# Patient Record
Sex: FEMALE | Race: WHITE | NOT HISPANIC OR LATINO | Employment: FULL TIME | URBAN - METROPOLITAN AREA
[De-identification: names, ages, dates, MRNs, and addresses within clinical notes are randomized per-mention and may not be internally consistent; named-entity substitution may affect disease eponyms.]

---

## 2017-03-20 ENCOUNTER — MYC MEDICAL ADVICE (OUTPATIENT)
Dept: FAMILY MEDICINE | Facility: CLINIC | Age: 42
End: 2017-03-20

## 2017-03-23 ENCOUNTER — OFFICE VISIT (OUTPATIENT)
Dept: FAMILY MEDICINE | Facility: CLINIC | Age: 42
End: 2017-03-23
Payer: COMMERCIAL

## 2017-03-23 VITALS
TEMPERATURE: 99.3 F | WEIGHT: 146.75 LBS | SYSTOLIC BLOOD PRESSURE: 106 MMHG | HEIGHT: 69 IN | DIASTOLIC BLOOD PRESSURE: 74 MMHG | BODY MASS INDEX: 21.73 KG/M2 | OXYGEN SATURATION: 96 % | HEART RATE: 80 BPM

## 2017-03-23 DIAGNOSIS — Z12.31 VISIT FOR SCREENING MAMMOGRAM: ICD-10-CM

## 2017-03-23 DIAGNOSIS — Z00.00 ROUTINE GENERAL MEDICAL EXAMINATION AT A HEALTH CARE FACILITY: Primary | ICD-10-CM

## 2017-03-23 DIAGNOSIS — Z30.9 ENCOUNTER FOR CONTRACEPTIVE MANAGEMENT, UNSPECIFIED CONTRACEPTIVE ENCOUNTER TYPE: ICD-10-CM

## 2017-03-23 DIAGNOSIS — Z13.6 CARDIOVASCULAR SCREENING; LDL GOAL LESS THAN 160: ICD-10-CM

## 2017-03-23 DIAGNOSIS — Z13.1 SCREENING FOR DIABETES MELLITUS: ICD-10-CM

## 2017-03-23 LAB
CHOLEST SERPL-MCNC: 197 MG/DL
GLUCOSE SERPL-MCNC: 92 MG/DL (ref 70–99)
HDLC SERPL-MCNC: 71 MG/DL
LDLC SERPL CALC-MCNC: 116 MG/DL
NONHDLC SERPL-MCNC: 126 MG/DL
TRIGL SERPL-MCNC: 52 MG/DL

## 2017-03-23 PROCEDURE — 80061 LIPID PANEL: CPT | Performed by: FAMILY MEDICINE

## 2017-03-23 PROCEDURE — 82947 ASSAY GLUCOSE BLOOD QUANT: CPT | Performed by: FAMILY MEDICINE

## 2017-03-23 PROCEDURE — 99396 PREV VISIT EST AGE 40-64: CPT | Performed by: FAMILY MEDICINE

## 2017-03-23 PROCEDURE — 36415 COLL VENOUS BLD VENIPUNCTURE: CPT | Performed by: FAMILY MEDICINE

## 2017-03-23 NOTE — PATIENT INSTRUCTIONS
1. Schedule a Mammogram at your discretion.       Preventive Health Recommendations  Female Ages 40 to 49    Yearly exam:     See your health care provider every year in order to  1. Review health changes.   2. Discuss preventive care.    3. Review your medicines if your doctor prescribed any.      Get a Pap test every three years (unless you have an abnormal result and your provider advises testing more often).      If you get Pap tests with HPV test, you only need to test every 5 years, unless you have an abnormal result. You do not need a Pap test if your uterus was removed (hysterectomy) and you have not had cancer.      You should be tested each year for STDs (sexually transmitted diseases), if you're at risk.       Ask your doctor if you should have a mammogram.      Have a colonoscopy (test for colon cancer) if someone in your family has had colon cancer or polyps before age 50.       Have a cholesterol test every 5 years.       Have a diabetes test (fasting glucose) after age 45. If you are at risk for diabetes, you should have this test every 3 years.    Shots: Get a flu shot each year. Get a tetanus shot every 10 years.     Nutrition:     Eat at least 5 servings of fruits and vegetables each day.    Eat whole-grain bread, whole-wheat pasta and brown rice instead of white grains and rice.    Talk to your provider about Calcium and Vitamin D.     Lifestyle    Exercise at least 150 minutes a week (an average of 30 minutes a day, 5 days a week). This will help you control your weight and prevent disease.    Limit alcohol to one drink per day.    No smoking.     Wear sunscreen to prevent skin cancer.    See your dentist every six months for an exam and cleaning.

## 2017-03-23 NOTE — PROGRESS NOTES
SUBJECTIVE:     CC: Naina Merlos is an 42 year old woman who presents for preventive health visit.     Physical   Annual:     Getting at least 3 servings of Calcium per day::  Yes    Bi-annual eye exam::  Yes    Dental care twice a year::  Yes    Sleep apnea or symptoms of sleep apnea::  None    Diet::  Regular (no restrictions)    Frequency of exercise::  2-3 days/week    Duration of exercise::  Greater than 60 minutes    Taking medications regularly::  Not Applicable    Additional concerns today::  No    Answers for HPI/ROS submitted by the patient on 3/20/2017   Q1: Little interest or pleasure in doing things: 0=Not at all  Q2: Feeling down, depressed or hopeless: 0=Not at all  PHQ-2 Score: 0      Jordon had a vasectomy.    She was trying to incorporate flax seed into her diet to aid in lowering her cholesterol. Does not feel yogurt has helped. They are in the process of moving so her exercise has cut back to 1-2 times per week.    Stress at work has improved. There is some financial stress but overall better, not impacting her mood.       Today's PHQ-2 Score:   PHQ-2 ( 1999 Pfizer) 3/20/2017   Little interest or pleasure in doing things Not at all   Feeling down, depressed or hopeless Not at all   PHQ-2 Score 0     Abuse: Current or Past(Physical, Sexual or Emotional)- No  Do you feel safe in your environment - Yes    Social History   Substance Use Topics     Smoking status: Never Smoker     Smokeless tobacco: Never Used     Alcohol use Yes      Comment: 3-5 drinks weekly     The patient does not drink >3 drinks per day nor >7 drinks per week.    Recent Labs   Lab Test  03/15/16   0944  03/10/15   0855  03/03/14   0849   CHOL  242*  231*  217*   HDL  57  73  62   LDL  166*  138*  131*   TRIG  95  98  118   CHOLHDLRATIO   --   3.2  3.5   NHDL  185*   --    --    Reviewed orders with patient.  Reviewed health maintenance and updated orders accordingly - Yes    Mammo Decision Support:  Patient over age 50,  mutual decision to screen reflected in health maintenance.  Pertinent mammograms are reviewed under the imaging tab.  History of abnormal Pap smear:   NO - age 30- 65 PAP every 3 years recommended  Last 3 Pap Results:   PAP (no units)   Date Value   03/10/2015 NIL   02/06/2012 NIL   Reviewed and updated as needed this visit by clinical staff  Reviewed and updated as needed this visit by Provider   Past Medical History:   Diagnosis Date     Allergic rhinitis, cause unspecified      CARDIOVASCULAR SCREENING; LDL GOAL LESS THAN 160 11/18/2010      ROS:   ROS: 10 point ROS neg other than the symptoms noted above in the HPI.    This document serves as a record of the services and decisions personally performed and made by Elena Hartley MD. It was created on his/her behalf by Sharona Mora, trained medical scribe. The creation of this document is based the provider's statements to the medical scribes.    Scribe Sharona Mora, March 23, 2017  BP Readings from Last 3 Encounters:   03/23/17 106/74   03/15/16 102/87   03/10/15 122/70    Wt Readings from Last 3 Encounters:   03/23/17 66.6 kg (146 lb 12 oz)   03/15/16 68.5 kg (151 lb)   03/10/15 66.2 kg (146 lb)        Patient Active Problem List   Diagnosis     Allergic rhinitis     CARDIOVASCULAR SCREENING; LDL GOAL LESS THAN 160     Contraception     Past Surgical History:   Procedure Laterality Date     no surgeries         Social History   Substance Use Topics     Smoking status: Never Smoker     Smokeless tobacco: Never Used     Alcohol use Yes      Comment: 3-5 drinks weekly     Family History   Problem Relation Age of Onset     CANCER Mother      lymphoma         No current outpatient prescriptions on file.     Allergies   Allergen Reactions     Penicillins      Recent Labs   Lab Test  03/15/16   0944  03/10/15   0855  03/03/14   0849   11/16/10   0832   LDL  166*  138*  131*   < >  126   HDL  57  73  62   < >  61   TRIG  95  98  118   < >  114   TSH   --    --     "--    --   1.26    < > = values in this interval not displayed.      OBJECTIVE:     /74 (Cuff Size: Adult Regular)  Pulse 80  Temp 99.3  F (37.4  C) (Oral)  Ht 1.74 m (5' 8.5\")  Wt 66.6 kg (146 lb 12 oz)  LMP 02/25/2017  SpO2 96%  BMI 21.99 kg/m2  EXAM:  GENERAL: healthy, alert and no distress  EYES: Eyes grossly normal to inspection, PERRL and conjunctivae and sclerae normal  HENT: ear canals and TM's normal, nose and mouth without ulcers or lesions  NECK: no adenopathy, no asymmetry, masses, or scars and thyroid normal to palpation  RESP: lungs clear to auscultation - no rales, rhonchi or wheezes  BREAST: normal without masses, tenderness or nipple discharge and no palpable axillary masses or adenopathy  CV: regular rate and rhythm, normal S1 S2, no S3 or S4, no murmur, click or rub, no peripheral edema and peripheral pulses strong  ABDOMEN: soft, nontender, no hepatosplenomegaly, no masses and bowel sounds normal  MS: no gross musculoskeletal defects noted, no edema  SKIN: no suspicious lesions or rashes  NEURO: Normal strength and tone, mentation intact and speech normal  PSYCH: mentation appears normal, affect normal/bright    ASSESSMENT/PLAN:     1. Routine general medical examination at a health care facility  -- PAP due next year  Mammogram this year   2. CARDIOVASCULAR SCREENING; LDL GOAL LESS THAN 160  Cholesterol was elevated at last preventative visit. Rechecking today.  - LIPID REFLEX TO DIRECT LDL PANEL    3. Encounter for contraceptive management, unspecified contraceptive encounter type   had a vasectomy.     4. Visit for screening mammogram  Dense breast tissue with yearly mammograms recommended. She will schedule at her discretion this year or continue to follow up with mammograms every other year.     5. Screening for diabetes mellitus  No fhx of diabetes, checking glucose with fasting labs.      COUNSELING:  Reviewed preventive health counseling, as reflected in patient " "instructions   reports that she has never smoked. She has never used smokeless tobacco.  Estimated body mass index is 21.99 kg/(m^2) as calculated from the following:    Height as of this encounter: 1.74 m (5' 8.5\").    Weight as of this encounter: 66.6 kg (146 lb 12 oz).   Weight management plan noted, stable and monitoring    Counseling Resources:  ATP IV Guidelines  Pooled Cohorts Equation Calculator  Breast Cancer Risk Calculator  FRAX Risk Assessment  ICSI Preventive Guidelines  Dietary Guidelines for Americans, 2010  USDA's MyPlate  ASA Prophylaxis  Lung CA Screening    The information in this document, created by the medical scribe for me, accurately reflects the services I personally performed and the decisions made by me. I have reviewed and approved this document for accuracy prior to leaving the patient care area. 03/23/17    Elena Hartley MD  [facility]      "

## 2017-03-23 NOTE — NURSING NOTE
"Chief Complaint   Patient presents with     Physical     pt is fasting        Initial /74 (Cuff Size: Adult Regular)  Pulse 80  Temp 99.3  F (37.4  C) (Oral)  Ht 5' 8.5\" (1.74 m)  Wt 146 lb 12 oz (66.6 kg)  LMP 02/25/2017  SpO2 96%  BMI 21.99 kg/m2 Estimated body mass index is 21.99 kg/(m^2) as calculated from the following:    Height as of this encounter: 5' 8.5\" (1.74 m).    Weight as of this encounter: 146 lb 12 oz (66.6 kg).  Medication Reconciliation: complete     Veronica Wilson, PAUL      "

## 2017-03-23 NOTE — MR AVS SNAPSHOT
After Visit Summary   3/23/2017    Naina Merlos    MRN: [id number]           Patient Information     Date Of Birth          1975        Visit Information        Provider Department      3/23/2017 8:00 AM Elena Hartley MD [facility]        Today's Diagnoses     Routine general medical examination at a health care facility    -  1    CARDIOVASCULAR SCREENING; LDL GOAL LESS THAN 160        Encounter for contraceptive management, unspecified contraceptive encounter type        Visit for screening mammogram        Screening for diabetes mellitus          Care Instructions    1. Schedule a Mammogram at your discretion.       Preventive Health Recommendations  Female Ages 40 to 49    Yearly exam:     See your health care provider every year in order to  1. Review health changes.   2. Discuss preventive care.    3. Review your medicines if your doctor prescribed any.      Get a Pap test every three years (unless you have an abnormal result and your provider advises testing more often).      If you get Pap tests with HPV test, you only need to test every 5 years, unless you have an abnormal result. You do not need a Pap test if your uterus was removed (hysterectomy) and you have not had cancer.      You should be tested each year for STDs (sexually transmitted diseases), if you're at risk.       Ask your doctor if you should have a mammogram.      Have a colonoscopy (test for colon cancer) if someone in your family has had colon cancer or polyps before age 50.       Have a cholesterol test every 5 years.       Have a diabetes test (fasting glucose) after age 45. If you are at risk for diabetes, you should have this test every 3 years.    Shots: Get a flu shot each year. Get a tetanus shot every 10 years.     Nutrition:     Eat at least 5 servings of fruits and vegetables each day.    Eat whole-grain bread, whole-wheat pasta and brown rice instead of white grains and rice.    Talk to  your provider about Calcium and Vitamin D.     Lifestyle    Exercise at least 150 minutes a week (an average of 30 minutes a day, 5 days a week). This will help you control your weight and prevent disease.    Limit alcohol to one drink per day.    No smoking.     Wear sunscreen to prevent skin cancer.    See your dentist every six months for an exam and cleaning.        Follow-ups after your visit        Future tests that were ordered for you today     Open Future Orders        Priority Expected Expires Ordered    MA Screen Bilateral w/Lexa Routine  3/23/2018 3/23/2017            Who to contact     If you have questions or need follow up information about today's clinic visit or your schedule please contact [facility] directly at [phone].  Normal or non-critical lab and imaging results will be communicated to you by Excellence4uhart, letter or phone within 4 business days after the clinic has received the results. If you do not hear from us within 7 days, please contact the clinic through Brightleaft or phone. If you have a critical or abnormal lab result, we will notify you by phone as soon as possible.  Submit refill requests through myParcelDelivery or call your pharmacy and they will forward the refill request to us. Please allow 3 business days for your refill to be completed.          Additional Information About Your Visit        myParcelDelivery Information     myParcelDelivery gives you secure access to your electronic health record. If you see a primary care provider, you can also send messages to your care team and make appointments. If you have questions, please call your primary care clinic.  If you do not have a primary care provider, please call [phone] and they will assist you.        Care EveryWhere ID     This is your Care EveryWhere ID. This could be used by other organizations to access your [place] medical records  [id number]        Your Vitals Were     Pulse Temperature Height Last Period Pulse Oximetry  "BMI (Body Mass Index)    80 99.3  F (37.4  C) (Oral) 5' 8.5\" (1.74 m) 02/25/2017 96% 21.99 kg/m2       Blood Pressure from Last 3 Encounters:   03/23/17 106/74   03/15/16 102/87   03/10/15 122/70    Weight from Last 3 Encounters:   03/23/17 146 lb 12 oz (66.6 kg)   03/15/16 151 lb (68.5 kg)   03/10/15 146 lb (66.2 kg)              We Performed the Following     Glucose     LIPID REFLEX TO DIRECT LDL PANEL        Primary Care Provider Office Phone # Fax #    Elena Hartley [phone]       [address] [address]        Thank you!     Thank you for choosing [facility]  for your care. Our goal is always to provide you with excellent care. Hearing back from our patients is one way we can continue to improve our services. Please take a few minutes to complete the written survey that you may receive in the mail after your visit with us. Thank you!             Your Updated Medication List - Protect others around you: Learn how to safely use, store and throw away your medicines at www.disposemymeds.org.      Notice  As of 3/23/2017  8:18 AM    You have not been prescribed any medications.      "

## 2017-04-05 NOTE — PROGRESS NOTES
Excellent! Please call or sent a GlassBox message if you have any questions. Elena Hartley M.D.       Desired or goal lipid levels are:  CHOLESTEROL: Desirable is less than 200.  HDL (Good Cholesterol): Desirable is greater than 40 in men and greater than 50 in women.  LDL (Bad Cholesterol): Desirable is less than 130 or less than 100 in patients with diabetes or vascular disease. For some patients with diabetes or vascular disease, the desireable LDL is less than 70.  TRIGLYCERIDES: Desirable is less than 150.

## 2018-03-24 ENCOUNTER — MYC MEDICAL ADVICE (OUTPATIENT)
Dept: FAMILY MEDICINE | Facility: CLINIC | Age: 43
End: 2018-03-24

## 2018-03-24 DIAGNOSIS — Z13.6 CARDIOVASCULAR SCREENING; LDL GOAL LESS THAN 160: Primary | ICD-10-CM

## 2018-03-24 DIAGNOSIS — Z13.1 SCREENING FOR DIABETES MELLITUS: ICD-10-CM

## 2018-03-26 NOTE — TELEPHONE ENCOUNTER
Lipid panel pended, along with glucose.    Dr. Hartley-Please review and sign if agree.    Thank you!  ELEN Giles, YULIYAN, RN

## 2018-03-27 ENCOUNTER — OFFICE VISIT (OUTPATIENT)
Dept: FAMILY MEDICINE | Facility: CLINIC | Age: 43
End: 2018-03-27
Payer: COMMERCIAL

## 2018-03-27 VITALS
TEMPERATURE: 97.7 F | SYSTOLIC BLOOD PRESSURE: 109 MMHG | OXYGEN SATURATION: 98 % | HEIGHT: 69 IN | DIASTOLIC BLOOD PRESSURE: 62 MMHG | WEIGHT: 149 LBS | RESPIRATION RATE: 10 BRPM | HEART RATE: 58 BPM | BODY MASS INDEX: 22.07 KG/M2

## 2018-03-27 DIAGNOSIS — Z00.00 ENCOUNTER FOR ROUTINE ADULT HEALTH EXAMINATION WITHOUT ABNORMAL FINDINGS: Primary | ICD-10-CM

## 2018-03-27 DIAGNOSIS — Z13.1 SCREENING FOR DIABETES MELLITUS: ICD-10-CM

## 2018-03-27 DIAGNOSIS — Z13.6 CARDIOVASCULAR SCREENING; LDL GOAL LESS THAN 160: ICD-10-CM

## 2018-03-27 DIAGNOSIS — Z12.31 VISIT FOR SCREENING MAMMOGRAM: ICD-10-CM

## 2018-03-27 LAB
CHOLEST SERPL-MCNC: 207 MG/DL
GLUCOSE SERPL-MCNC: 83 MG/DL (ref 70–99)
HDLC SERPL-MCNC: 56 MG/DL
LDLC SERPL CALC-MCNC: 131 MG/DL
NONHDLC SERPL-MCNC: 151 MG/DL
TRIGL SERPL-MCNC: 100 MG/DL

## 2018-03-27 PROCEDURE — 82947 ASSAY GLUCOSE BLOOD QUANT: CPT | Performed by: FAMILY MEDICINE

## 2018-03-27 PROCEDURE — G0145 SCR C/V CYTO,THINLAYER,RESCR: HCPCS | Performed by: FAMILY MEDICINE

## 2018-03-27 PROCEDURE — 87624 HPV HI-RISK TYP POOLED RSLT: CPT | Performed by: FAMILY MEDICINE

## 2018-03-27 PROCEDURE — 36415 COLL VENOUS BLD VENIPUNCTURE: CPT | Performed by: FAMILY MEDICINE

## 2018-03-27 PROCEDURE — 80061 LIPID PANEL: CPT | Performed by: FAMILY MEDICINE

## 2018-03-27 PROCEDURE — 99396 PREV VISIT EST AGE 40-64: CPT | Performed by: FAMILY MEDICINE

## 2018-03-27 NOTE — MR AVS SNAPSHOT
After Visit Summary   3/27/2018    Naina Merlos    MRN: [id number]           Patient Information     Date Of Birth          1975        Visit Information        Provider Department      3/27/2018 7:40 AM Elena Hartley MD [facility]        Today's Diagnoses     Encounter for routine adult health examination without abnormal findings    -  1    Visit for screening mammogram          Care Instructions      Preventive Health Recommendations  Female Ages 40 to 49    Yearly exam:     See your health care provider every year in order to  1. Review health changes.   2. Discuss preventive care.    3. Review your medicines if your doctor prescribed any.      Get a Pap test every three years (unless you have an abnormal result and your provider advises testing more often).      If you get Pap tests with HPV test, you only need to test every 5 years, unless you have an abnormal result. You do not need a Pap test if your uterus was removed (hysterectomy) and you have not had cancer.      You should be tested each year for STDs (sexually transmitted diseases), if you're at risk.       Ask your doctor if you should have a mammogram.      Have a colonoscopy (test for colon cancer) if someone in your family has had colon cancer or polyps before age 50.       Have a cholesterol test every 5 years.       Have a diabetes test (fasting glucose) after age 45. If you are at risk for diabetes, you should have this test every 3 years.    Shots: Get a flu shot each year. Get a tetanus shot every 10 years.     Nutrition:     Eat at least 5 servings of fruits and vegetables each day.    Eat whole-grain bread, whole-wheat pasta and brown rice instead of white grains and rice.    Talk to your provider about Calcium and Vitamin D.     Lifestyle    Exercise at least 150 minutes a week (an average of 30 minutes a day, 5 days a week). This will help you control your weight and prevent disease.    Limit  "alcohol to one drink per day.    No smoking.     Wear sunscreen to prevent skin cancer.    See your dentist every six months for an exam and cleaning.          Follow-ups after your visit        Future tests that were ordered for you today     Open Future Orders        Priority Expected Expires Ordered    MA SCREENING DIGITAL BILAT - Future  (s+30) Routine  3/27/2019 3/27/2018            Who to contact     If you have questions or need follow up information about today's clinic visit or your schedule please contact [facility] directly at [phone].  Normal or non-critical lab and imaging results will be communicated to you by Vatorhart, letter or phone within 4 business days after the clinic has received the results. If you do not hear from us within 7 days, please contact the clinic through [employer] or phone. If you have a critical or abnormal lab result, we will notify you by phone as soon as possible.  Submit refill requests through Thatgamecompany or call your pharmacy and they will forward the refill request to us. Please allow 3 business days for your refill to be completed.          Additional Information About Your Visit        Vatorhart Information     Thatgamecompany gives you secure access to your electronic health record. If you see a primary care provider, you can also send messages to your care team and make appointments. If you have questions, please call your primary care clinic.  If you do not have a primary care provider, please call [phone] and they will assist you.        Care EveryWhere ID     This is your Care EveryWhere ID. This could be used by other organizations to access your [place] medical records  [id number]        Your Vitals Were     Pulse Temperature Respirations Height Last Period Pulse Oximetry    58 97.7  F (36.5  C) (Tympanic) 10 5' 8.5\" (1.74 m) 03/21/2018 98%    BMI (Body Mass Index)                   22.33 kg/m2            Blood Pressure from Last 3 Encounters:   03/27/18 " 109/62   03/23/17 106/74   03/15/16 102/87    Weight from Last 3 Encounters:   03/27/18 149 lb (67.6 kg)   03/23/17 146 lb 12 oz (66.6 kg)   03/15/16 151 lb (68.5 kg)              We Performed the Following     HPV High Risk Types DNA Cervical     Pap imaged thin layer screen with HPV - recommended age 30 - 65 years (select HPV order below)        Primary Care Provider Office Phone # Fax #    Elena Hartley [phone] [address]  [facility] [address]        Equal Access to Services     CLAUDIO [place] : Hadii keli fernández hadgrazyna Poole, wajose torres, pk kaalmawilliam reyes, marco cai . So [facility] [phone].    ATENCIÓN: Si habla español, tiene a godoy disposición servicios gratuitos de asistencia lingüística. [facility] [phone].    We comply with applicable federal civil rights laws and [place] laws. We do not discriminate on the basis of race, color, national origin, age, disability, sex, sexual orientation, or gender identity.            Thank you!     Thank you for choosing [facility]  for your care. Our goal is always to provide you with excellent care. Hearing back from our patients is one way we can continue to improve our services. Please take a few minutes to complete the written survey that you may receive in the mail after your visit with us. Thank you!             Your Updated Medication List - Protect others around you: Learn how to safely use, store and throw away your medicines at www.disposemymeds.org.      Notice  As of 3/27/2018  7:51 AM    You have not been prescribed any medications.

## 2018-03-27 NOTE — PROGRESS NOTES
SUBJECTIVE:   CC: Naina Merlos is an 43 year old woman who presents for preventive health visit.     Physical   Annual:     Getting at least 3 servings of Calcium per day::  Yes    Bi-annual eye exam::  Yes    Dental care twice a year::  Yes    Sleep apnea or symptoms of sleep apnea::  None    Diet::  Regular (no restrictions)    Frequency of exercise::  2-3 days/week    Duration of exercise::  45-60 minutes    Taking medications regularly::  Not Applicable    Additional concerns today::  No          She recently got  at the end of last year and moved to [place]. She still works in the city, she has some mild annoyances with her coworkers, but nothing she can't manage.          Today's PHQ-2 Score:   PHQ-2 ( 1999 [employer]) 3/24/2018   Q1: Little interest or pleasure in doing things 0   Q2: Feeling down, depressed or hopeless 0   PHQ-2 Score 0   Q1: Little interest or pleasure in doing things Not at all   Q2: Feeling down, depressed or hopeless Not at all   PHQ-2 Score 0     Answers for HPI/ROS submitted by the patient on 3/24/2018   PHQ-2 Score: 0    Abuse: Current or Past(Physical, Sexual or Emotional)- No  Do you feel safe in your environment - Yes    Social History   Substance Use Topics     Smoking status: Never Smoker     Smokeless tobacco: Never Used     Alcohol use Yes      Comment: 3-5 drinks weekly     Alcohol Use 3/24/2018   If you drink alcohol do you typically have greater than 3 drinks per day OR greater than 7 drinks per week? No   No flowsheet data found.    Reviewed orders with patient.  Reviewed health maintenance and updated orders accordingly - Yes  Labs reviewed in EPIC    Patient under age 50, mutual decision reflected in health maintenance.      Pertinent mammograms are reviewed under the imaging tab.  History of abnormal Pap smear: NO - age 30-65 PAP every 5 years with negative HPV co-testing recommended    Reviewed and updated as needed this visit by clinical staff  Tobacco   "Allergies  Meds  Med Hx  Surg Hx  Fam Hx  Soc Hx      Reviewed and updated as needed this visit by Provider        Past Medical History:   Diagnosis Date     Allergic rhinitis, cause unspecified      CARDIOVASCULAR SCREENING; LDL GOAL LESS THAN 160 11/18/2010      Past Surgical History:   Procedure Laterality Date     no surgeries         Review of Systems  10 point ROS is negative except as noted above.     This document serves as a record of the services and decisions personally performed and made by Elena Hartley MD. It was created on her behalf by Kaylah Sanchez, a trained medical scribe. The creation of this document is based the provider's statements to the medical scribe.    Kaylah Sanchez, March 27, 2018 7:53 AM    OBJECTIVE:   /62  Pulse 58  Temp 97.7  F (36.5  C) (Tympanic)  Resp 10  Ht 1.74 m (5' 8.5\")  Wt 67.6 kg (149 lb)  LMP 03/21/2018  SpO2 98%  BMI 22.33 kg/m2  Physical Exam  GENERAL: healthy, alert and no distress  EYES: Eyes grossly normal to inspection, PERRL and conjunctivae and sclerae normal  HENT: ear canals and TM's normal, nose and mouth without ulcers or lesions  NECK: no adenopathy, no asymmetry, masses, or scars and thyroid normal to palpation  RESP: lungs clear to auscultation - no rales, rhonchi or wheezes  BREAST: normal without masses, tenderness or nipple discharge and no palpable axillary masses or adenopathy  CV: regular rate and rhythm, normal S1 S2, no S3 or S4, no murmur, click or rub, no peripheral edema and peripheral pulses strong  ABDOMEN: soft, nontender, no hepatosplenomegaly, no masses and bowel sounds normal   (female): normal female external genitalia, normal urethral meatus, vaginal mucosa pink, moist, well rugated, and normal cervix   MS: no gross musculoskeletal defects noted, no edema  SKIN: no suspicious lesions or rashes  NEURO: Normal strength and tone, mentation intact and speech normal  PSYCH: mentation appears normal, affect " "normal/bright    ASSESSMENT/PLAN:   1. Encounter for routine adult health examination without abnormal findings  Patient due for her PAP smear today. Can repeat in 5 years per guidelines if normal.   She will schedule her mammogram  - Pap imaged thin layer screen with HPV - recommended age 30 - 65 years (select HPV order below)  - HPV High Risk Types DNA Cervical    2. Visit for screening mammogram     - MA SCREENING DIGITAL BILAT - Future  (s+30); Future    3. CARDIOVASCULAR SCREENING; LDL GOAL LESS THAN 160  Pt requests recheck lipids given history of elevated lipids. Last year, lipids looked great.   - Lipid panel reflex to direct LDL Fasting    4. Screening for diabetes mellitus  Last glucose lab was 92 on 3/23/17. Pt requests glucose today.   - Glucose    COUNSELING:  Reviewed preventive health counseling, as reflected in patient instructions       Healthy diet/nutrition        PAP Smear       Screening Mammogram     reports that she has never smoked. She has never used smokeless tobacco.  Estimated body mass index is 22.33 kg/(m^2) as calculated from the following:    Height as of this encounter: 1.74 m (5' 8.5\").    Weight as of this encounter: 67.6 kg (149 lb).     Counseling Resources:  ATP IV Guidelines  Pooled Cohorts Equation Calculator  Breast Cancer Risk Calculator  FRAX Risk Assessment  ICSI Preventive Guidelines  Dietary Guidelines for Americans, 2010  USDA's MyPlate  ASA Prophylaxis  Lung CA Screening    The information in this document, created by the medical scribe for me, accurately reflects the services I personally performed and the decisions made by me. I have reviewed and approved this document for accuracy.     Elena Hartley MD, MD  [facility]  "

## 2018-03-28 LAB
COPATH REPORT: NORMAL
PAP: NORMAL

## 2018-03-29 NOTE — PROGRESS NOTES
The results of your recent lipid (cholesterol) profile were mildly abnormal.    Here are the results:  Lab Results       Component                Value               Date                       CHOL                     207                 03/27/2018            Lab Results       Component                Value               Date                       HDL                      56                  03/27/2018            Lab Results       Component                Value               Date                       LDL                      131                 03/27/2018            Lab Results       Component                Value               Date                       TRIG                     100                 03/27/2018            Lab Results       Component                Value               Date                       CHOLHDLRATIO             3.2                 03/10/2015              Desired or goal levels are:  CHOLESTEROL: Desirable is less than 200.   HDL (Good Cholesterol): Desirable is greater than 40 (for men) greater than 50 (for women).  LDL (Bad Cholesterol): Desirable is less than 130 (or less than 100 if you have heart disease or diabetes). Borderline 130-160.  TRIGLYCERIDES: Desirable is less than 150.  Borderline is 150-200.    To help lower your LDL (bad cholesterol) you could start adding ground flax seed to your diet. The recommended dose is 2 heaping tablespoonfuls daily, you may want to start with 1 tablespoonful and increase to 2 heaping tablespoonfuls. You can mix or add ground flax seed to hot cereals, yogurt, applesauce, cottage cheese or any sauce mixture that is your portion. Ground flax seed can be found in the baking aisle near the flour.      As you may know, an elevated cholesterol is one factor that increases your risk for heart disease and stroke. You can improve your cholesterol by controlling the amount and type of fat you eat and by increasing your daily activity level.    Here are some  ways to improve your nutrition:  Eat less fat (especially butter, Crisco and other saturated fats)  Buy lean cuts of meat, reduce your portions of red meat or substitute poultry or fish  Use skim milk and low-fat dairy products  Eat no more than 4 egg yolks per week  Avoid fried or fast foods that are high in fat  Eat more fruits and vegetables      Also consider starting or increasing your aerobic activity. Aerobic activity is the best way to improve HDL (good) cholesterol. If this would be new to you, please talk with me first about what activities are safe for you.      Other lab results:    Your glucose (blood sugar) was normal.     Please feel free to contact us with any questions or if you would like more information.    Elena Hatrley M.D.

## 2018-03-30 LAB
FINAL DIAGNOSIS: NORMAL
HPV HR 12 DNA CVX QL NAA+PROBE: NEGATIVE
HPV16 DNA SPEC QL NAA+PROBE: NEGATIVE
HPV18 DNA SPEC QL NAA+PROBE: NEGATIVE
SPECIMEN DESCRIPTION: NORMAL
SPECIMEN SOURCE CVX/VAG CYTO: NORMAL

## 2018-04-11 ENCOUNTER — HOSPITAL ENCOUNTER (OUTPATIENT)
Dept: MAMMOGRAPHY | Facility: CLINIC | Age: 43
Discharge: HOME | End: 2018-04-11
Attending: FAMILY MEDICINE | Admitting: FAMILY MEDICINE
Payer: COMMERCIAL

## 2018-04-11 DIAGNOSIS — Z12.31 VISIT FOR SCREENING MAMMOGRAM: ICD-10-CM

## 2018-04-11 PROCEDURE — 77067 SCR MAMMO BI INCL CAD: CPT

## 2019-04-01 ASSESSMENT — ENCOUNTER SYMPTOMS
PARESTHESIAS: 0
CONSTIPATION: 0
COUGH: 1
FREQUENCY: 0
ABDOMINAL PAIN: 0
ARTHRALGIAS: 0
PALPITATIONS: 0
DIZZINESS: 0
MYALGIAS: 0
EYE PAIN: 0
HEADACHES: 0
HEARTBURN: 0
SORE THROAT: 0
NERVOUS/ANXIOUS: 0
FEVER: 0
WEAKNESS: 0
NAUSEA: 0
CHILLS: 0
BREAST MASS: 0
HEMATURIA: 0
DYSURIA: 0
JOINT SWELLING: 0
DIARRHEA: 0
SHORTNESS OF BREATH: 0
HEMATOCHEZIA: 0

## 2019-04-04 ENCOUNTER — OFFICE VISIT (OUTPATIENT)
Dept: FAMILY MEDICINE | Facility: CLINIC | Age: 44
End: 2019-04-04
Payer: COMMERCIAL

## 2019-04-04 VITALS
WEIGHT: 152 LBS | HEIGHT: 69 IN | TEMPERATURE: 98.7 F | BODY MASS INDEX: 22.51 KG/M2 | DIASTOLIC BLOOD PRESSURE: 53 MMHG | HEART RATE: 61 BPM | OXYGEN SATURATION: 100 % | SYSTOLIC BLOOD PRESSURE: 95 MMHG

## 2019-04-04 DIAGNOSIS — Z00.00 ENCOUNTER FOR ROUTINE ADULT HEALTH EXAMINATION WITHOUT ABNORMAL FINDINGS: Primary | ICD-10-CM

## 2019-04-04 DIAGNOSIS — Z12.31 VISIT FOR SCREENING MAMMOGRAM: ICD-10-CM

## 2019-04-04 DIAGNOSIS — Z30.9 ENCOUNTER FOR CONTRACEPTIVE MANAGEMENT, UNSPECIFIED TYPE: ICD-10-CM

## 2019-04-04 DIAGNOSIS — Z13.1 SCREENING FOR DIABETES MELLITUS: ICD-10-CM

## 2019-04-04 DIAGNOSIS — Z13.6 CARDIOVASCULAR SCREENING; LDL GOAL LESS THAN 160: ICD-10-CM

## 2019-04-04 DIAGNOSIS — Z00.00 ROUTINE GENERAL MEDICAL EXAMINATION AT A HEALTH CARE FACILITY: ICD-10-CM

## 2019-04-04 LAB
CHOLEST SERPL-MCNC: 201 MG/DL
GLUCOSE SERPL-MCNC: 86 MG/DL (ref 70–99)
HDLC SERPL-MCNC: 48 MG/DL
LDLC SERPL CALC-MCNC: 126 MG/DL
NONHDLC SERPL-MCNC: 153 MG/DL
TRIGL SERPL-MCNC: 134 MG/DL

## 2019-04-04 PROCEDURE — 80061 LIPID PANEL: CPT | Performed by: FAMILY MEDICINE

## 2019-04-04 PROCEDURE — 99396 PREV VISIT EST AGE 40-64: CPT | Performed by: FAMILY MEDICINE

## 2019-04-04 PROCEDURE — 82947 ASSAY GLUCOSE BLOOD QUANT: CPT | Performed by: FAMILY MEDICINE

## 2019-04-04 PROCEDURE — 36415 COLL VENOUS BLD VENIPUNCTURE: CPT | Performed by: FAMILY MEDICINE

## 2019-04-04 ASSESSMENT — MIFFLIN-ST. JEOR: SCORE: 1385.91

## 2019-04-04 ASSESSMENT — ENCOUNTER SYMPTOMS
NAUSEA: 0
HEMATURIA: 0
DYSURIA: 0
SORE THROAT: 0
BREAST MASS: 0
NERVOUS/ANXIOUS: 0
DIARRHEA: 0
ABDOMINAL PAIN: 0
DIZZINESS: 0
ARTHRALGIAS: 0
COUGH: 1
PARESTHESIAS: 0
JOINT SWELLING: 0
CHILLS: 0
HEMATOCHEZIA: 0
CONSTIPATION: 0
SHORTNESS OF BREATH: 0
PALPITATIONS: 0
FREQUENCY: 0
MYALGIAS: 0
EYE PAIN: 0
WEAKNESS: 0
HEARTBURN: 0
FEVER: 0
HEADACHES: 0

## 2019-04-04 NOTE — PROGRESS NOTES
SUBJECTIVE:   CC: Naina Merlos is an 44 year old woman who presents for preventive health visit.     Physical   Annual:     Getting at least 3 servings of Calcium per day:  Yes    Bi-annual eye exam:  Yes    Dental care twice a year:  Yes    Sleep apnea or symptoms of sleep apnea:  None    Diet:  Regular (no restrictions)    Frequency of exercise:  4-5 days/week    Duration of exercise:  45-60 minutes    Taking medications regularly:  Not Applicable    Additional concerns today:  No    PHQ-2 Total Score: 0    She has a cold. This is improving and she has no concerns about it.     Today's PHQ-2 Score:   PHQ-2 ( 1999 [employer]) 4/1/2019   Q1: Little interest or pleasure in doing things 0   Q2: Feeling down, depressed or hopeless 0   PHQ-2 Score 0   Q1: Little interest or pleasure in doing things Not at all   Q2: Feeling down, depressed or hopeless Not at all   PHQ-2 Score 0       Abuse: Current or Past(Physical, Sexual or Emotional)- No  Do you feel safe in your environment? Yes    Social History     Tobacco Use     Smoking status: Never Smoker     Smokeless tobacco: Never Used   Substance Use Topics     Alcohol use: Yes     Comment: 3-5 drinks weekly     Alcohol Use 4/1/2019   If you drink alcohol do you typically have greater than 3 drinks per day OR greater than 7 drinks per week? No   No flowsheet data found.    Reviewed orders with patient.  Reviewed health maintenance and updated orders accordingly - Yes  BP Readings from Last 3 Encounters:   04/04/19 95/53   03/27/18 109/62   03/23/17 106/74    Wt Readings from Last 3 Encounters:   04/04/19 68.9 kg (152 lb)   03/27/18 67.6 kg (149 lb)   03/23/17 66.6 kg (146 lb 12 oz)                  Patient Active Problem List   Diagnosis     Allergic rhinitis     CARDIOVASCULAR SCREENING; LDL GOAL LESS THAN 160     Contraception     Past Surgical History:   Procedure Laterality Date     no surgeries         Social History     Tobacco Use     Smoking status: Never  Smoker     Smokeless tobacco: Never Used   Substance Use Topics     Alcohol use: Yes     Comment: 3-5 drinks weekly     Family History   Problem Relation Age of Onset     Cancer Mother         lymphoma     Hyperlipidemia Mother      Other Cancer Mother         Non-Hodgkins Lymphoma -     Thyroid Disease Mother              Hyperlipidemia Father      Diabetes Cousin      Coronary Artery Disease Maternal Grandmother         Triple bypass -      Cerebrovascular Disease Maternal Grandmother              Other Cancer Maternal Grandmother         Had kidney removed - ; vertibrae -      Cerebrovascular Disease Maternal Grandfather         Pacemaker     Breast Cancer Cousin      Breast Cancer Paternal Grandmother           -  years old         No current outpatient medications on file.     Allergies   Allergen Reactions     Penicillins      Recent Labs   Lab Test 18  0812 17  0820 03/15/16  0944   * 116* 166*   HDL 56 71 57   TRIG 100 52 95        Mammogram Screening: Patient under age 50, mutual decision reflected in health maintenance.      Pertinent mammograms are reviewed under the imaging tab.  History of abnormal Pap smear:   NO - age 30-65 PAP every 5 years with negative HPV co-testing recommended  Last 3 Pap and HPV Results:   PAP / HPV Latest Ref Rng & Units 3/27/2018 3/10/2015 2012   PAP - OTHER-NIL, See Result NIL NIL   HPV 16 DNA NEG:Negative Negative - -   HPV 18 DNA NEG:Negative Negative - -   OTHER HR HPV NEG:Negative Negative - -     PAP / HPV Latest Ref Rng & Units 3/27/2018 3/10/2015 2012   PAP - OTHER-NIL, See Result NIL NIL   HPV 16 DNA NEG:Negative Negative - -   HPV 18 DNA NEG:Negative Negative - -   OTHER HR HPV NEG:Negative Negative - -     Reviewed and updated as needed this visit by clinical staff  Tobacco  Allergies  Meds         Reviewed and updated as needed this visit by Provider        Past Medical History:   Diagnosis Date      "Allergic rhinitis, cause unspecified      CARDIOVASCULAR SCREENING; LDL GOAL LESS THAN 160 2010      Past Surgical History:   Procedure Laterality Date     no surgeries       Obstetric History       T0      L0     SAB0   TAB0   Ectopic0   Multiple0   Live Births0           Review of Systems   Constitutional: Negative for chills and fever.   HENT: Positive for congestion. Negative for ear pain, hearing loss and sore throat.    Eyes: Negative for pain and visual disturbance.   Respiratory: Positive for cough. Negative for shortness of breath.    Cardiovascular: Negative for chest pain, palpitations and peripheral edema.   Gastrointestinal: Negative for abdominal pain, constipation, diarrhea, heartburn, hematochezia and nausea.   Breasts:  Negative for tenderness, breast mass and discharge.   Genitourinary: Negative for dysuria, frequency, genital sores, hematuria, pelvic pain, urgency, vaginal bleeding and vaginal discharge.   Musculoskeletal: Negative for arthralgias, joint swelling and myalgias.   Skin: Negative for rash.   Neurological: Negative for dizziness, weakness, headaches and paresthesias.   Psychiatric/Behavioral: Negative for mood changes. The patient is not nervous/anxious.       generally, her periods have been roughly every 22 days. One cycle shorter, one cycle longer.      OBJECTIVE:   BP 95/53 (BP Location: Left arm, Patient Position: Sitting, Cuff Size: Adult Large)   Pulse 61   Temp 98.7  F (37.1  C) (Oral)   Ht 1.74 m (5' 8.5\")   Wt 68.9 kg (152 lb)   LMP 2019 (Approximate)   SpO2 100%   BMI 22.78 kg/m     Wt Readings from Last 5 Encounters:   19 68.9 kg (152 lb)   18 67.6 kg (149 lb)   17 66.6 kg (146 lb 12 oz)   03/15/16 68.5 kg (151 lb)   03/10/15 66.2 kg (146 lb)      Physical Exam  GENERAL: healthy, alert and no distress  EYES: Eyes grossly normal to inspection, PERRL and conjunctivae and sclerae normal  HENT: ear canals and TM's normal, nose " "and mouth without ulcers or lesions  NECK: no adenopathy, no asymmetry, masses, or scars and thyroid normal to palpation  RESP: lungs clear to auscultation - no rales, rhonchi or wheezes  BREAST: normal without masses, tenderness or nipple discharge and no palpable axillary masses or adenopathy  CV: regular rate and rhythm, normal S1 S2, no S3 or S4, no murmur, click or rub, no peripheral edema and peripheral pulses strong  ABDOMEN: soft, nontender, no hepatosplenomegaly, no masses and bowel sounds normal  MS: no gross musculoskeletal defects noted, no edema  SKIN: no suspicious lesions or rashes  NEURO: Normal strength and tone, mentation intact and speech normal  PSYCH: mentation appears normal, affect normal/bright    Diagnostic Test Results:  none     ASSESSMENT/PLAN:   1. Encounter for routine adult health examination without abnormal findings  She would like to do her mammogram next year     2. Visit for screening mammogram  Plan for next year     3. CARDIOVASCULAR SCREENING; LDL GOAL LESS THAN 160     - Lipid panel reflex to direct LDL Fasting    4. Screening for diabetes mellitus     - Glucose    5. Encounter for contraceptive management, unspecified type  Partner had vasectomy        COUNSELING:  Reviewed preventive health counseling, as reflected in patient instructions    BP Readings from Last 1 Encounters:   04/04/19 95/53     Estimated body mass index is 22.78 kg/m  as calculated from the following:    Height as of this encounter: 1.74 m (5' 8.5\").    Weight as of this encounter: 68.9 kg (152 lb).           reports that  has never smoked. she has never used smokeless tobacco.      Counseling Resources:  ATP IV Guidelines  Pooled Cohorts Equation Calculator  Breast Cancer Risk Calculator  FRAX Risk Assessment  ICSI Preventive Guidelines  Dietary Guidelines for Americans, 2010  [employer] MyPlate  ASA Prophylaxis  Lung CA Screening    Elena Hartley MD, MD  [facility] [place]  "

## 2019-04-08 NOTE — RESULT ENCOUNTER NOTE
The results of your recent lipid (cholesterol) profile were mildly abnormal.    Here are the results:  Lab Results       Component                Value               Date                       CHOL                     201                 04/04/2019            Lab Results       Component                Value               Date                       HDL                      48                  04/04/2019            Lab Results       Component                Value               Date                       LDL                      126                 04/04/2019            Lab Results       Component                Value               Date                       TRIG                     134                 04/04/2019            Lab Results       Component                Value               Date                       CHOLHDLRHECTORO             3.2                 03/10/2015              Desired or goal levels are:  CHOLESTEROL: Desirable is less than 200.   HDL (Good Cholesterol): Desirable is greater than 40 (for men) greater than 50 (for women).  LDL (Bad Cholesterol): Desirable is less than 130 (or less than 100 if you have heart disease or diabetes). Borderline 130-160.  TRIGLYCERIDES: Desirable is less than 150.  Borderline is 150-200.    Your HDL (the good cholesterol) level is low, no medication is required at this point. Reducing your total fat intake, increasing exercise level, reducing weight, adding olive oil to your diet, etc, may help to increase this level.  To help lower your LDL (bad cholesterol) you could start adding ground flax seed to your diet. The recommended dose is 2 heaping tablespoonfuls daily, you may want to start with 1 tablespoonful and increase to 2 heaping tablespoonfuls. You can mix or add ground flax seed to hot cereals, yogurt, applesauce, cottage cheese or any sauce mixture that is your portion. Ground flax seed can be found in the baking aisle near the flour.      As you may know, an  elevated cholesterol is one factor that increases your risk for heart disease and stroke. You can improve your cholesterol by controlling the amount and type of fat you eat and by increasing your daily activity level.    Here are some ways to improve your nutrition:  Eat less fat (especially butter, Crisco and other saturated fats)  Buy lean cuts of meat, reduce your portions of red meat or substitute poultry or fish  Use skim milk and low-fat dairy products  Eat no more than 4 egg yolks per week  Avoid fried or fast foods that are high in fat  Eat more fruits and vegetables      Also consider starting or increasing your aerobic activity. Aerobic activity is the best way to improve HDL (good) cholesterol. If this would be new to you, please talk with me first about what activities are safe for you.      Other lab results :    Your glucose (blood sugar) was normal.     Please feel free to contact us with any questions or if you would like more information.      Elena Hartley M.D.

## 2019-11-05 ENCOUNTER — HEALTH MAINTENANCE LETTER (OUTPATIENT)
Age: 44
End: 2019-11-05

## 2020-11-22 ENCOUNTER — HEALTH MAINTENANCE LETTER (OUTPATIENT)
Age: 45
End: 2020-11-22

## 2021-08-16 ENCOUNTER — OFFICE VISIT (OUTPATIENT)
Dept: FAMILY MEDICINE | Facility: CLINIC | Age: 46
End: 2021-08-16
Payer: COMMERCIAL

## 2021-08-16 VITALS
SYSTOLIC BLOOD PRESSURE: 113 MMHG | OXYGEN SATURATION: 100 % | WEIGHT: 159 LBS | HEIGHT: 69 IN | HEART RATE: 74 BPM | DIASTOLIC BLOOD PRESSURE: 71 MMHG | TEMPERATURE: 97.8 F | BODY MASS INDEX: 23.55 KG/M2

## 2021-08-16 DIAGNOSIS — Z11.59 ENCOUNTER FOR HEPATITIS C SCREENING TEST FOR LOW RISK PATIENT: ICD-10-CM

## 2021-08-16 DIAGNOSIS — Z11.4 SCREENING FOR HIV (HUMAN IMMUNODEFICIENCY VIRUS): ICD-10-CM

## 2021-08-16 DIAGNOSIS — Z00.00 ROUTINE GENERAL MEDICAL EXAMINATION AT A HEALTH CARE FACILITY: Primary | ICD-10-CM

## 2021-08-16 DIAGNOSIS — Z12.31 VISIT FOR SCREENING MAMMOGRAM: ICD-10-CM

## 2021-08-16 DIAGNOSIS — Z12.11 SCREENING FOR COLON CANCER: ICD-10-CM

## 2021-08-16 DIAGNOSIS — Z11.59 NEED FOR HEPATITIS C SCREENING TEST: ICD-10-CM

## 2021-08-16 LAB
HCV AB SERPL QL IA: NONREACTIVE
HIV 1+2 AB+HIV1 P24 AG SERPL QL IA: NONREACTIVE

## 2021-08-16 PROCEDURE — 86803 HEPATITIS C AB TEST: CPT | Performed by: FAMILY MEDICINE

## 2021-08-16 PROCEDURE — 87389 HIV-1 AG W/HIV-1&-2 AB AG IA: CPT | Performed by: FAMILY MEDICINE

## 2021-08-16 PROCEDURE — 99396 PREV VISIT EST AGE 40-64: CPT | Performed by: FAMILY MEDICINE

## 2021-08-16 PROCEDURE — 36415 COLL VENOUS BLD VENIPUNCTURE: CPT | Performed by: FAMILY MEDICINE

## 2021-08-16 ASSESSMENT — ENCOUNTER SYMPTOMS
HEMATURIA: 0
SHORTNESS OF BREATH: 0
HEMATOCHEZIA: 0
NAUSEA: 0
CHILLS: 0
MYALGIAS: 0
COUGH: 0
EYE PAIN: 0
ABDOMINAL PAIN: 0
CONSTIPATION: 0
HEARTBURN: 0
WEAKNESS: 0
HEADACHES: 0
FREQUENCY: 0
NERVOUS/ANXIOUS: 0
ARTHRALGIAS: 0
DIZZINESS: 0
DIARRHEA: 0
JOINT SWELLING: 0
BREAST MASS: 0
FEVER: 0
SORE THROAT: 0
PARESTHESIAS: 0
DYSURIA: 0
PALPITATIONS: 0

## 2021-08-16 ASSESSMENT — MIFFLIN-ST. JEOR: SCORE: 1407.66

## 2021-08-16 NOTE — PROGRESS NOTES
SUBJECTIVE:   CC: Naina Merlos is an 46 year old woman who presents for preventive health visit.     Patient has been advised of split billing requirements and indicates understanding: Yes  Healthy Habits:     Getting at least 3 servings of Calcium per day:  Yes    Bi-annual eye exam:  Yes    Dental care twice a year:  Yes    Sleep apnea or symptoms of sleep apnea:  None    Diet:  Regular (no restrictions)    Frequency of exercise:  4-5 days/week    Duration of exercise:  45-60 minutes    Taking medications regularly:  Not Applicable    Medication side effects:  Not applicable    PHQ-2 Total Score: 0    Additional concerns today:  No    She is getting over a cold , some congestion, but mostly resolved  covid test was negative   Feels like she has an eyelash in her eye. Red eye. Not painful. Wearing her contacts. No vision changes.   Had her covid vaccine     Today's PHQ-2 Score:   PHQ-2 ( 1999 Pfizer) 8/16/2021   Q1: Little interest or pleasure in doing things 0   Q2: Feeling down, depressed or hopeless 0   PHQ-2 Score 0   Q1: Little interest or pleasure in doing things Not at all   Q2: Feeling down, depressed or hopeless Not at all   PHQ-2 Score 0       Abuse: Current or Past (Physical, Sexual or Emotional) - No  Do you feel safe in your environment? Yes    Have you ever done Advance Care Planning? (For example, a Health Directive, POLST, or a discussion with a medical provider or your loved ones about your wishes): No, advance care planning information given to patient to review.  Patient plans to discuss their wishes with loved ones or provider.      Social History     Tobacco Use     Smoking status: Never Smoker     Smokeless tobacco: Never Used   Substance Use Topics     Alcohol use: Yes     Comment: 3-5 drinks weekly     If you drink alcohol do you typically have >3 drinks per day or >7 drinks per week? No    Alcohol Use 8/16/2021   Prescreen: >3 drinks/day or >7 drinks/week? No   Prescreen: >3  drinks/day or >7 drinks/week? -   No flowsheet data found.    Reviewed orders with patient.  Reviewed health maintenance and updated orders accordingly - Yes       Breast Cancer Screening:  Any new diagnosis of family breast, ovarian, or bowel cancer? No    FHS-7:   Breast CA Risk Assessment (FHS-7) 2021   Did any of your first-degree relatives have breast or ovarian cancer? Yes   Did any of your relatives have bilateral breast cancer? Unknown   Did any man in your family have breast cancer? No   Did any woman in your family have breast and ovarian cancer? Unknown   Did any woman in your family have breast cancer before age 50 y? Unknown   Do you have 2 or more relatives with breast and/or ovarian cancer? No   Do you have 2 or more relatives with breast and/or bowel cancer? No   no first degree relative with breast cancer.        Pertinent mammograms are reviewed under the imaging tab.    History of abnormal Pap smear:    PAP / HPV Latest Ref Rng & Units 3/27/2018 3/10/2015 2012   PAP (Historical) - OTHER-NIL, See Result NIL NIL   HPV16 NEG:Negative Negative - -   HPV18 NEG:Negative Negative - -   HRHPV NEG:Negative Negative - -     Reviewed and updated as needed this visit by clinical staff  Tobacco  Allergies  Meds              Reviewed and updated as needed this visit by Provider                Past Medical History:   Diagnosis Date     Allergic rhinitis, cause unspecified      CARDIOVASCULAR SCREENING; LDL GOAL LESS THAN 160 2010      Past Surgical History:   Procedure Laterality Date     no surgeries       OB History    Para Term  AB Living   0 0 0 0 0 0   SAB TAB Ectopic Multiple Live Births   0 0 0 0 0       Review of Systems   Constitutional: Negative for chills and fever.   HENT: Positive for congestion. Negative for ear pain, hearing loss and sore throat.    Eyes: Negative for pain and visual disturbance.   Respiratory: Negative for cough and shortness of breath.   "  Cardiovascular: Negative for chest pain, palpitations and peripheral edema.   Gastrointestinal: Negative for abdominal pain, constipation, diarrhea, heartburn, hematochezia and nausea.   Breasts:  Negative for tenderness, breast mass and discharge.   Genitourinary: Negative for dysuria, frequency, genital sores, hematuria, pelvic pain, urgency, vaginal bleeding and vaginal discharge.   Musculoskeletal: Negative for arthralgias, joint swelling and myalgias.   Skin: Negative for rash.   Neurological: Negative for dizziness, weakness, headaches and paresthesias.   Psychiatric/Behavioral: Negative for mood changes. The patient is not nervous/anxious.            OBJECTIVE:   /71 (BP Location: Right arm, Patient Position: Sitting, Cuff Size: Adult Regular)   Pulse 74   Temp 97.8  F (36.6  C) (Tympanic)   Ht 1.74 m (5' 8.5\")   Wt 72.1 kg (159 lb)   SpO2 100%   BMI 23.82 kg/m     Wt Readings from Last 5 Encounters:   08/16/21 72.1 kg (159 lb)   04/04/19 68.9 kg (152 lb)   03/27/18 67.6 kg (149 lb)   03/23/17 66.6 kg (146 lb 12 oz)   03/15/16 68.5 kg (151 lb)      Physical Exam  GENERAL: healthy, alert and no distress  EYES: Eyes grossly normal to inspection, PERRL and conjunctivae and sclerae normal  HENT: ear canals and TM's normal, nose and mouth without ulcers or lesions  NECK: no adenopathy, no asymmetry, masses, or scars and thyroid normal to palpation  RESP: lungs clear to auscultation - no rales, rhonchi or wheezes  CV: regular rate and rhythm, normal S1 S2, no S3 or S4, no murmur, click or rub, no peripheral edema and peripheral pulses strong  ABDOMEN: soft, nontender, no hepatosplenomegaly, no masses and bowel sounds normal  MS: no gross musculoskeletal defects noted, no edema  SKIN: no suspicious lesions or rashes  NEURO: Normal strength and tone, mentation intact and speech normal  PSYCH: mentation appears normal, affect normal/bright    Diagnostic Test Results:  Labs reviewed in " "Epic    ASSESSMENT/PLAN:       ICD-10-CM    1. Routine general medical examination at a health care facility  Z00.00    2. Need for hepatitis C screening test  Z11.59 Hepatitis C Screen Reflex to HCV RNA Quant and Genotype   3. Visit for screening mammogram  Z12.31 MA SCREENING DIGITAL BILAT - Future  (s+30)   4. Lipid screening  Z13.220    5. Screening for diabetes mellitus  Z13.1    6. Screening for colon cancer  Z12.11 COLOGUARD(EXACT SCIENCES)   7. Encounter for hepatitis C screening test for low risk patient  Z11.59    8. Screening for HIV (human immunodeficiency virus)  Z11.4 HIV Antigen Antibody Combo     She has received both doses of the Pfizer COVID-19 vaccine.    Pap due in 2023  I recommended considering colon cancer screening with cologuard--order placed   has had a vasectomy      COUNSELING:  Reviewed preventive health counseling, as reflected in patient instructions    Estimated body mass index is 23.82 kg/m  as calculated from the following:    Height as of this encounter: 1.74 m (5' 8.5\").    Weight as of this encounter: 72.1 kg (159 lb).        She reports that she has never smoked. She has never used smokeless tobacco.      Counseling Resources:  ATP IV Guidelines  Pooled Cohorts Equation Calculator  Breast Cancer Risk Calculator  BRCA-Related Cancer Risk Assessment: FHS-7 Tool  FRAX Risk Assessment  ICSI Preventive Guidelines  Dietary Guidelines for Americans, 2010  USDA's MyPlate  ASA Prophylaxis  Lung CA Screening    Elena Hartley MD   [facility]  "

## 2021-08-16 NOTE — PATIENT INSTRUCTIONS
"I recommend reducing sugar and flour in your diet, eat two to three meals per day and avoid snacking between meals, consider reading \"The Obesity Code\" by Navin Thompson MD.    "

## 2021-08-18 NOTE — RESULT ENCOUNTER NOTE
Excellent! Please call or send a [employer] message if you have any questions. Elena Hartley M.D.

## 2021-08-27 ENCOUNTER — HOSPITAL ENCOUNTER (OUTPATIENT)
Dept: MAMMOGRAPHY | Facility: CLINIC | Age: 46
Discharge: HOME | End: 2021-08-27
Attending: FAMILY MEDICINE | Admitting: FAMILY MEDICINE
Payer: COMMERCIAL

## 2021-08-27 DIAGNOSIS — Z12.31 VISIT FOR SCREENING MAMMOGRAM: ICD-10-CM

## 2021-08-27 PROCEDURE — 77063 BREAST TOMOSYNTHESIS BI: CPT

## 2021-09-13 LAB — COLOGUARD-ABSTRACT: NEGATIVE

## 2021-09-19 ENCOUNTER — HEALTH MAINTENANCE LETTER (OUTPATIENT)
Age: 46
End: 2021-09-19

## 2021-09-22 NOTE — RESULT ENCOUNTER NOTE
Excellent! Please call or send a [employer] message if you have any questions. Elena Hartley M.D.

## 2021-11-09 ENCOUNTER — MYC MEDICAL ADVICE (OUTPATIENT)
Dept: FAMILY MEDICINE | Facility: CLINIC | Age: 46
End: 2021-11-09
Payer: COMMERCIAL

## 2021-11-11 NOTE — TELEPHONE ENCOUNTER
Writer responded via [employer].    YULIYA LeeN, RN  [facility]

## 2022-11-20 ENCOUNTER — HEALTH MAINTENANCE LETTER (OUTPATIENT)
Age: 47
End: 2022-11-20

## 2023-05-20 ASSESSMENT — ENCOUNTER SYMPTOMS
HEMATOCHEZIA: 0
COUGH: 0
HEARTBURN: 0
FEVER: 0
ARTHRALGIAS: 0
WEAKNESS: 0
HEADACHES: 0
DYSURIA: 0
SHORTNESS OF BREATH: 0
FREQUENCY: 0
CONSTIPATION: 0
PARESTHESIAS: 0
NAUSEA: 0
PALPITATIONS: 0
EYE PAIN: 0
DIZZINESS: 0
CHILLS: 0
NERVOUS/ANXIOUS: 0
JOINT SWELLING: 0
MYALGIAS: 0
BREAST MASS: 0
SORE THROAT: 0
HEMATURIA: 0
ABDOMINAL PAIN: 0
DIARRHEA: 0

## 2023-05-23 ENCOUNTER — OFFICE VISIT (OUTPATIENT)
Dept: FAMILY MEDICINE | Facility: CLINIC | Age: 48
End: 2023-05-23
Payer: COMMERCIAL

## 2023-05-23 VITALS
OXYGEN SATURATION: 100 % | WEIGHT: 157 LBS | DIASTOLIC BLOOD PRESSURE: 75 MMHG | SYSTOLIC BLOOD PRESSURE: 108 MMHG | RESPIRATION RATE: 15 BRPM | HEIGHT: 69 IN | TEMPERATURE: 97.4 F | HEART RATE: 72 BPM | BODY MASS INDEX: 23.25 KG/M2

## 2023-05-23 DIAGNOSIS — N95.1 MENOPAUSAL SYNDROME (HOT FLASHES): ICD-10-CM

## 2023-05-23 DIAGNOSIS — Z12.31 SCREENING MAMMOGRAM, ENCOUNTER FOR: ICD-10-CM

## 2023-05-23 DIAGNOSIS — N89.8 VAGINAL IRRITATION: ICD-10-CM

## 2023-05-23 DIAGNOSIS — Z13.220 LIPID SCREENING: ICD-10-CM

## 2023-05-23 DIAGNOSIS — Z13.1 SCREENING FOR DIABETES MELLITUS: ICD-10-CM

## 2023-05-23 DIAGNOSIS — Z00.00 ROUTINE GENERAL MEDICAL EXAMINATION AT A HEALTH CARE FACILITY: Primary | ICD-10-CM

## 2023-05-23 DIAGNOSIS — N95.8 GENITOURINARY SYNDROME OF MENOPAUSE: ICD-10-CM

## 2023-05-23 LAB
CHOLEST SERPL-MCNC: 251 MG/DL
FASTING STATUS PATIENT QL REPORTED: NORMAL
GLUCOSE SERPL-MCNC: 94 MG/DL (ref 70–99)
HDLC SERPL-MCNC: 73 MG/DL
LDLC SERPL CALC-MCNC: 166 MG/DL
NONHDLC SERPL-MCNC: 178 MG/DL
TRIGL SERPL-MCNC: 58 MG/DL

## 2023-05-23 PROCEDURE — 82947 ASSAY GLUCOSE BLOOD QUANT: CPT | Performed by: FAMILY MEDICINE

## 2023-05-23 PROCEDURE — 99396 PREV VISIT EST AGE 40-64: CPT | Performed by: FAMILY MEDICINE

## 2023-05-23 PROCEDURE — 80061 LIPID PANEL: CPT | Performed by: FAMILY MEDICINE

## 2023-05-23 PROCEDURE — G0145 SCR C/V CYTO,THINLAYER,RESCR: HCPCS | Performed by: FAMILY MEDICINE

## 2023-05-23 PROCEDURE — 99214 OFFICE O/P EST MOD 30 MIN: CPT | Mod: 25 | Performed by: FAMILY MEDICINE

## 2023-05-23 PROCEDURE — 87624 HPV HI-RISK TYP POOLED RSLT: CPT | Performed by: FAMILY MEDICINE

## 2023-05-23 PROCEDURE — 36415 COLL VENOUS BLD VENIPUNCTURE: CPT | Performed by: FAMILY MEDICINE

## 2023-05-23 RX ORDER — ESTRADIOL 0.1 MG/G
2 CREAM VAGINAL DAILY
Qty: 42.5 G | Refills: 0 | Status: SHIPPED | OUTPATIENT
Start: 2023-05-23 | End: 2023-08-03

## 2023-05-23 RX ORDER — PROGESTERONE 100 MG/1
100 CAPSULE ORAL DAILY
Qty: 90 CAPSULE | Refills: 3 | Status: SHIPPED | OUTPATIENT
Start: 2023-05-23 | End: 2023-11-21 | Stop reason: DRUGHIGH

## 2023-05-23 RX ORDER — ESTRADIOL 0.1 MG/G
2 CREAM VAGINAL
Qty: 42.5 G | Refills: 3 | Status: SHIPPED | OUTPATIENT
Start: 2023-05-25 | End: 2023-08-03

## 2023-05-23 RX ORDER — ESTRADIOL 0.05 MG/D
1 PATCH, EXTENDED RELEASE TRANSDERMAL
Qty: 24 PATCH | Refills: 1 | Status: SHIPPED | OUTPATIENT
Start: 2023-05-25 | End: 2023-08-11

## 2023-05-23 ASSESSMENT — ENCOUNTER SYMPTOMS
NAUSEA: 0
WEAKNESS: 0
CONSTIPATION: 0
HEMATURIA: 0
FREQUENCY: 0
SHORTNESS OF BREATH: 0
EYE PAIN: 0
HEARTBURN: 0
ARTHRALGIAS: 0
PALPITATIONS: 0
BREAST MASS: 0
HEADACHES: 0
CHILLS: 0
SORE THROAT: 0
NERVOUS/ANXIOUS: 0
ABDOMINAL PAIN: 0
DIZZINESS: 0
JOINT SWELLING: 0
DIARRHEA: 0
HEMATOCHEZIA: 0
COUGH: 0
DYSURIA: 0
FEVER: 0
PARESTHESIAS: 0
MYALGIAS: 0

## 2023-05-23 ASSESSMENT — PAIN SCALES - GENERAL: PAINLEVEL: NO PAIN (0)

## 2023-05-23 NOTE — PROGRESS NOTES
SUBJECTIVE:   CC: Trina is an 48 year old who presents for preventive health visit.       5/23/2023     8:42 AM   Additional Questions   Roomed by Samantha Jimenes   Patient has been advised of split billing requirements and indicates understanding: Yes  Healthy Habits:     Getting at least 3 servings of Calcium per day:  Yes    Bi-annual eye exam:  Yes    Dental care twice a year:  Yes    Sleep apnea or symptoms of sleep apnea:  None    Diet:  Regular (no restrictions)    Frequency of exercise:  6-7 days/week    Duration of exercise:  30-45 minutes    Taking medications regularly:  Not Applicable    Medication side effects:  Not applicable    PHQ-2 Total Score: 0    Additional concerns today:  No      It has been almost a year since her last period. Sex is painful. Even with glide lubricant. Wonders if CBD gummies would help. Does notice vaginal dryness. Less frequent sex now because it is more painful.     Has hot flashes all of the time. Started November 2019, mild. Then jan and feb. Then that summer a little. Then about 2.5 years ago was starting to have significant hot flashes. Couple of times during the night will wake up with them. Also gets thirsty and drinking water helps. Gets covered in sweat. Last summer it came with emotional sense of trauma as well. That has gotten better, but gets tired when they come on and can sense them coming on. Seem to be getting farther apart and not quite as bad as last year. Restless sleep. Wakes up a couple of times every night due to night sweats.     Has had weight gain around the middle. Having hard time losing it despite exercising regularly and eating well.     Notes her lower back has been painful and attributed that to when she visited her sister and brother in law and slept on an inflatable mattress. Was bad for a couple of days. Now intermittrently bad. Mild at this point. Does gardening. Cleaning the deck last night.     She is a nonsmoker. No hx of blood clot. No fhx  blood clot. No hx breast cancer. No significant breast cancer risk in the family. Denies any difficulty tolerating hormones in the past when taking SAGAR.        Today's PHQ-2 Score:       5/23/2023     8:37 AM   PHQ-2 ( 1999 Pfizer)   Q1: Little interest or pleasure in doing things 0   Q2: Feeling down, depressed or hopeless 0   PHQ-2 Score 0   Q1: Little interest or pleasure in doing things Not at all   Q2: Feeling down, depressed or hopeless Not at all   PHQ-2 Score 0           Social History     Tobacco Use     Smoking status: Never     Smokeless tobacco: Never   Vaping Use     Vaping status: Never Used   Substance Use Topics     Alcohol use: Yes     Comment: 3-5 drinks weekly             5/20/2023     7:19 AM   Alcohol Use   Prescreen: >3 drinks/day or >7 drinks/week? No          View : No data to display.              Reviewed orders with patient.  Reviewed health maintenance and updated orders accordingly - Yes       Breast Cancer Screening:    FHS-7:       8/16/2021     7:47 AM 8/27/2021    12:34 PM 10/8/2022    12:46 PM 5/20/2023     7:21 AM   Breast CA Risk Assessment (FHS-7)   Did any of your first-degree relatives have breast or ovarian cancer? Yes No No No   Did any of your relatives have bilateral breast cancer? Unknown No Unknown Unknown   Did any man in your family have breast cancer? No No No No   Did any woman in your family have breast and ovarian cancer? Unknown No No Yes   Did any woman in your family have breast cancer before age 50 y? Unknown No No Unknown   Do you have 2 or more relatives with breast and/or ovarian cancer? No Yes Yes No   Do you have 2 or more relatives with breast and/or bowel cancer? No No Yes No          History of abnormal Pap smear: NO - age 30-65 PAP every 5 years with negative HPV co-testing recommended      Latest Ref Rng & Units 3/27/2018     8:18 AM 3/27/2018     7:51 AM 3/10/2015    12:00 AM   PAP / HPV   PAP (Historical)   OTHER-NIL, See Result   NIL     HPV 16 DNA  "NEG^Negative Negative       HPV 18 DNA NEG^Negative Negative       Other HR HPV NEG^Negative Negative         Reviewed and updated as needed this visit by clinical staff   Tobacco  Allergies  Meds              Reviewed and updated as needed this visit by Provider                 Past Medical History:   Diagnosis Date     Allergic rhinitis, cause unspecified      CARDIOVASCULAR SCREENING; LDL GOAL LESS THAN 160 11/18/2010      Past Surgical History:   Procedure Laterality Date     no surgeries         Review of Systems   Constitutional: Negative for chills and fever.   HENT: Negative for congestion, ear pain, hearing loss and sore throat.    Eyes: Negative for pain and visual disturbance.   Respiratory: Negative for cough and shortness of breath.    Cardiovascular: Negative for chest pain, palpitations and peripheral edema.   Gastrointestinal: Negative for abdominal pain, constipation, diarrhea, heartburn, hematochezia and nausea.   Breasts:  Negative for tenderness, breast mass and discharge.   Genitourinary: Negative for dysuria, frequency, genital sores, hematuria, pelvic pain, urgency, vaginal bleeding and vaginal discharge.   Musculoskeletal: Negative for arthralgias, joint swelling and myalgias.   Skin: Negative for rash.   Neurological: Negative for dizziness, weakness, headaches and paresthesias.   Psychiatric/Behavioral: Negative for mood changes. The patient is not nervous/anxious.            OBJECTIVE:   /75 (BP Location: Right arm, Patient Position: Sitting, Cuff Size: Adult Regular)   Pulse 72   Temp 97.4  F (36.3  C) (Temporal)   Resp 15   Ht 1.74 m (5' 8.5\")   Wt 71.2 kg (157 lb)   LMP 03/27/2019 (Approximate)   SpO2 100%   BMI 23.52 kg/m       Wt Readings from Last 5 Encounters:   05/23/23 71.2 kg (157 lb)   08/16/21 72.1 kg (159 lb)   04/04/19 68.9 kg (152 lb)   03/27/18 67.6 kg (149 lb)   03/23/17 66.6 kg (146 lb 12 oz)      Physical Exam  GENERAL APPEARANCE: healthy, alert and no " distress  EYES: Eyes grossly normal to inspection, PERRL and conjunctivae and sclerae normal  HENT: ear canals and TM's normal, nose and mouth without ulcers or lesions, oropharynx clear and oral mucous membranes moist  NECK: no adenopathy, no asymmetry, masses, or scars and thyroid normal to palpation  RESP: lungs clear to auscultation - no rales, rhonchi or wheezes  CV: regular rate and rhythm, normal S1 S2, no S3 or S4, no murmur, click or rub, no peripheral edema and peripheral pulses strong  ABDOMEN: soft, nontender, no hepatosplenomegaly, no masses and bowel sounds normal   (female): normal female external genitalia, normal urethral meatus, vaginal mucosal atrophy noted, normal cervix, adnexae, and uterus without masses or abnormal discharge  MS: no musculoskeletal defects are noted and gait is age appropriate without ataxia  SKIN: no suspicious lesions or rashes  NEURO: Normal strength and tone, sensory exam grossly normal, mentation intact and speech normal  PSYCH: mentation appears normal and affect normal/bright    Diagnostic Test Results:  Labs reviewed in Epic    ASSESSMENT/PLAN:       ICD-10-CM    1. Routine general medical examination at a health care facility  Z00.00 Pap imaged thin layer screen with HPV - recommended age 30 - 65 years (select HPV order below)      2. Screening for diabetes mellitus  Z13.1 Glucose     Glucose      3. Lipid screening  Z13.220 Lipid panel reflex to direct LDL Fasting     Lipid panel reflex to direct LDL Fasting      4. Screening mammogram, encounter for  Z12.31 MA Screen Bilateral w/Lexa      5. Menopausal syndrome (hot flashes)  N95.1 estradiol (VIVELLE-DOT) 0.05 MG/24HR bi-weekly patch     progesterone (PROMETRIUM) 100 MG capsule      6. Genitourinary syndrome of menopause  N95.8 estradiol (ESTRACE) 0.1 MG/GM vaginal cream      7. Vaginal irritation  N89.8 estradiol (ESTRACE) 0.1 MG/GM vaginal cream     estradiol (ESTRACE) 0.1 MG/GM vaginal cream        Routine  preventive visit  Healthy   COVID-19 vaccine:Bivalent booster she completed on 9/18/22.   Pap with HPV cotesting completed today   colon cancer screening with cologuard-- repeat due 9/24    has had a vasectomy (and she is now menopausal) - no need for contraception   Sexual health pain at the introitus with intercourse, vaginal dryness  Menopause - night sweats, hot flashes  See below   Lipid and glucose screening today   Mammogram recommended    She had her first shingles vaccine as well.      Menopausal syndrome (hot flashes)  Discussed risks/side effects/benefits of HT. Needs progestogen for endometrial protection. Will start estradiol patch twice weekly and progesterone 100mg nightly. Gave information for menopause resources in pt instructions. Follow up with me in two months virtually and earlier as needed.     - estradiol (VIVELLE-DOT) 0.05 MG/24HR bi-weekly patch; Place 1 patch onto the skin twice a week  Dispense: 24 patch; Refill: 1  - progesterone (PROMETRIUM) 100 MG capsule; Take 1 capsule (100 mg) by mouth daily  Dispense: 90 capsule; Refill: 3     Genitourinary syndrome of menopause   discussed she could see benefit from systemic estrogen, but will go ahead and also start vaginal estrogen. Discussed risks/side effects/benefits/instructions for use.   - estradiol (ESTRACE) 0.1 MG/GM vaginal cream; Place 2 g vaginally twice a week Profile Rx: patient will contact pharmacy when needed  Dispense: 42.5 g; Refill: 3  - estradiol (ESTRACE) 0.1 MG/GM vaginal cream; Place 2 g vaginally daily Insert twice daily for two weeks, then twice weekly  Dispense: 42.5 g; Refill: 0      Patient Instructions   1. Start vaginal estrogen cream daily for two weeks, then twice weekly. You can also apply the cream with your finger to the vaginal opening, around the urethra and the vulva too.   2. Start the estradiol patch twice weekly and the progesterone pill once daily at bedtime.   3. I recommend checking back with me  in about 2 months and we can talk about adjusting doses if needed.     If you are interested in more information about menopause and perimenopause and like to listen to podcasts, check out Dr. Campos's Inside Information: The Menopause Podcast.     The North American Menopause Society (NAMS) website is a great resource for information about perimenopause and menopause.     Preventive Health Recommendations  Female Ages 50 - 64    Yearly exam: See your health care provider every year in order to  o Review health changes.   o Discuss preventive care.    o Review your medicines if your doctor has prescribed any.      Get a Pap test every three years (unless you have an abnormal result and your provider advises testing more often).    If you get Pap tests with HPV test, you only need to test every 5 years, unless you have an abnormal result.     You do not need a Pap test if your uterus was removed (hysterectomy) and you have not had cancer.    You should be tested each year for STDs (sexually transmitted diseases) if you're at risk.     Have a mammogram every 1 to 2 years.    Have a colonoscopy at age 50, or have a yearly FIT test (stool test). These exams screen for colon cancer.      Have a cholesterol test every 5 years, or more often if advised.    Have a diabetes test (fasting glucose) every three years. If you are at risk for diabetes, you should have this test more often.     If you are at risk for osteoporosis (brittle bone disease), think about having a bone density scan (DEXA).    Shots: Get a flu shot each year. Get a tetanus shot every 10 years.    Nutrition:     Eat at least 5 servings of fruits and vegetables each day.    Eat whole-grain bread, whole-wheat pasta and brown rice instead of white grains and rice.    Get adequate Calcium and Vitamin D.     Lifestyle    Exercise at least 150 minutes a week (30 minutes a day, 5 days a week). This will help you control your weight and prevent  disease.    Limit alcohol to one drink per day.    No smoking.     Wear sunscreen to prevent skin cancer.     See your dentist every six months for an exam and cleaning.    See your eye doctor every 1 to 2 years.         COUNSELING:  Reviewed preventive health counseling, as reflected in patient instructions        She reports that she has never smoked. She has never used smokeless tobacco.          Elena Hartley MD   [facility]

## 2023-05-23 NOTE — PATIENT INSTRUCTIONS
Start vaginal estrogen cream daily for two weeks, then twice weekly. You can also apply the cream with your finger to the vaginal opening, around the urethra and the vulva too.   Start the estradiol patch twice weekly and the progesterone pill once daily at bedtime.   I recommend checking back with me in about 2 months and we can talk about adjusting doses if needed.     If you are interested in more information about menopause and perimenopause and like to listen to podcasts, check out Dr. Campos's Inside Information: The Menopause Podcast.     The North American Menopause Society (NAMS) website is a great resource for information about perimenopause and menopause.     Preventive Health Recommendations  Female Ages 50 - 64    Yearly exam: See your health care provider every year in order to  Review health changes.   Discuss preventive care.    Review your medicines if your doctor has prescribed any.    Get a Pap test every three years (unless you have an abnormal result and your provider advises testing more often).  If you get Pap tests with HPV test, you only need to test every 5 years, unless you have an abnormal result.   You do not need a Pap test if your uterus was removed (hysterectomy) and you have not had cancer.  You should be tested each year for STDs (sexually transmitted diseases) if you're at risk.   Have a mammogram every 1 to 2 years.  Have a colonoscopy at age 50, or have a yearly FIT test (stool test). These exams screen for colon cancer.    Have a cholesterol test every 5 years, or more often if advised.  Have a diabetes test (fasting glucose) every three years. If you are at risk for diabetes, you should have this test more often.   If you are at risk for osteoporosis (brittle bone disease), think about having a bone density scan (DEXA).    Shots: Get a flu shot each year. Get a tetanus shot every 10 years.    Nutrition:   Eat at least 5 servings of fruits and vegetables each day.  Eat  whole-grain bread, whole-wheat pasta and brown rice instead of white grains and rice.  Get adequate Calcium and Vitamin D.     Lifestyle  Exercise at least 150 minutes a week (30 minutes a day, 5 days a week). This will help you control your weight and prevent disease.  Limit alcohol to one drink per day.  No smoking.   Wear sunscreen to prevent skin cancer.   See your dentist every six months for an exam and cleaning.  See your eye doctor every 1 to 2 years.

## 2023-05-24 ENCOUNTER — HOSPITAL ENCOUNTER (OUTPATIENT)
Dept: MAMMOGRAPHY | Facility: CLINIC | Age: 48
Discharge: HOME | End: 2023-05-24
Attending: FAMILY MEDICINE | Admitting: FAMILY MEDICINE
Payer: COMMERCIAL

## 2023-05-24 DIAGNOSIS — Z12.31 SCREENING MAMMOGRAM, ENCOUNTER FOR: ICD-10-CM

## 2023-05-24 PROCEDURE — 77067 SCR MAMMO BI INCL CAD: CPT

## 2023-05-24 NOTE — RESULT ENCOUNTER NOTE
The results of your recent lipid (cholesterol) profile were abnormal.    Here are the results:  Lab Results       Component                Value               Date                       CHOL                     251                 05/23/2023                 CHOL                     201                 04/04/2019            Lab Results       Component                Value               Date                       HDL                      73                  05/23/2023                 HDL                      48                  04/04/2019            Lab Results       Component                Value               Date                       LDL                      166                 05/23/2023                 LDL                      126                 04/04/2019            Lab Results       Component                Value               Date                       TRIG                     58                  05/23/2023                 TRIG                     134                 04/04/2019            Lab Results       Component                Value               Date                       CHOLHDLRATIO             3.2                 03/10/2015              Desired or goal levels are:  CHOLESTEROL: Desirable is less than 200.   HDL (Good Cholesterol): Desirable is greater than 40 (for men) greater than 50 (for women).  LDL (Bad Cholesterol): Desirable is less than 130 (or less than 100 if you have heart disease or diabetes). Borderline 130-160.  TRIGLYCERIDES: Desirable is less than 150.  Borderline is 150-200.    To help lower your LDL (bad cholesterol) you could start adding ground flax seed to your diet. The recommended dose is 2 heaping tablespoonfuls daily, you may want to start with 1 tablespoonful and increase to 2 heaping tablespoonfuls. You can mix or add ground flax seed to hot cereals, yogurt, applesauce, cottage cheese or any sauce mixture that is your portion. Ground flax seed can be found in the baking  aisle near the flour.     Your heart disease risk score as seen below is low. You do not need medication for your cholesterol.       The 10-year ASCVD risk score (Charlie DK, et al., 2019) is: 0.8%    Values used to calculate the score:      Age: 48 years      Sex: Female      Is Non- : No      Diabetic: No      Tobacco smoker: No      Systolic Blood Pressure: 108 mmHg      Is BP treated: No      HDL Cholesterol: 73 mg/dL      Total Cholesterol: 251 mg/dL     As you may know, an elevated cholesterol is one factor that increases your risk for heart disease and stroke. You can improve your cholesterol by controlling the amount and type of fat you eat and by increasing your daily activity level.    Here are some ways to improve your nutrition:  Eat less fat (especially butter, Crisco and other saturated fats)  Buy lean cuts of meat, reduce your portions of red meat or substitute poultry or fish  Use skim milk and low-fat dairy products  Eat no more than 4 egg yolks per week  Avoid fried or fast foods that are high in fat  Eat more fruits and vegetables      Also consider starting or increasing your aerobic activity. Aerobic activity is the best way to improve HDL (good) cholesterol. If this would be new to you, please talk with me first about what activities are safe for you.      Other lab results:    Your glucose (blood sugar) was normal.     Please feel free to contact us with any questions or if you would like more information.    Elena Hartley M.D.

## 2023-05-25 LAB
BKR LAB AP GYN ADEQUACY: NORMAL
BKR LAB AP GYN INTERPRETATION: NORMAL
BKR LAB AP HPV REFLEX: NORMAL
BKR LAB AP PREVIOUS ABNORMAL: NORMAL
PATH REPORT.COMMENTS IMP SPEC: NORMAL
PATH REPORT.COMMENTS IMP SPEC: NORMAL
PATH REPORT.RELEVANT HX SPEC: NORMAL

## 2023-05-26 LAB
HUMAN PAPILLOMA VIRUS 16 DNA: NEGATIVE
HUMAN PAPILLOMA VIRUS 18 DNA: NEGATIVE
HUMAN PAPILLOMA VIRUS FINAL DIAGNOSIS: NORMAL
HUMAN PAPILLOMA VIRUS OTHER HR: NEGATIVE

## 2023-07-30 ENCOUNTER — MYC MEDICAL ADVICE (OUTPATIENT)
Dept: FAMILY MEDICINE | Facility: CLINIC | Age: 48
End: 2023-07-30
Payer: COMMERCIAL

## 2023-07-30 DIAGNOSIS — N89.8 VAGINAL IRRITATION: ICD-10-CM

## 2023-08-03 RX ORDER — ESTRADIOL 0.1 MG/G
1 CREAM VAGINAL
Qty: 42.5 G | Refills: 3 | Status: SHIPPED | OUTPATIENT
Start: 2023-08-03 | End: 2024-05-27

## 2023-08-03 NOTE — TELEPHONE ENCOUNTER
Dr. Hartley: see pt msg for pharmacy and insurance run-around; ok for patient to do only 10 days of twice daily estradiol cream due to expense?  Sent clarifying message to patient re: prescriptions.    Last Sunday I filled the estrogen cream prescription and was given a tube that was about 42 grams that said it would last 20 days (apparently using a different dosage than the prescription you gave me). Today, since I was running low and going to the [facility] with my [facility] in it, I thought I'd see about getting another tube before I ran out short of the two weeks. They were unable to give me a refill (though the pharmacist said they could possible on Aug. 1) either because of the prescription or my insurance. It was difficult to say for sure. Given I've been doing 2 grams twice daily for a week now, and have enough cream to go for about 10 days, I was wondering about switching after that 10-day chadwick to 2 grams twice weekly - instead of going for the whole two weeks you originally prescribed. It was $70 for the first small tube, and sounds like it will be an insurance-coverage concern getting refills too early.     Dari CHRISTIANSON RN  [facility]

## 2023-08-11 ENCOUNTER — MYC MEDICAL ADVICE (OUTPATIENT)
Dept: FAMILY MEDICINE | Facility: CLINIC | Age: 48
End: 2023-08-11
Payer: COMMERCIAL

## 2023-08-11 DIAGNOSIS — N95.1 MENOPAUSAL SYNDROME (HOT FLASHES): ICD-10-CM

## 2023-08-11 RX ORDER — ESTRADIOL 0.05 MG/D
1 PATCH, EXTENDED RELEASE TRANSDERMAL
Qty: 24 PATCH | Refills: 0 | Status: SHIPPED | OUTPATIENT
Start: 2023-08-14 | End: 2023-10-31

## 2023-08-11 RX ORDER — ESTRADIOL 0.05 MG/D
1 PATCH, EXTENDED RELEASE TRANSDERMAL
Qty: 24 PATCH | Refills: 0 | Status: SHIPPED | OUTPATIENT
Start: 2023-08-14 | End: 2023-08-11

## 2023-08-11 NOTE — TELEPHONE ENCOUNTER
Responded to patient via MyChart    [facility] per [facility] request    YULIYA PrattN RN  [facility]

## 2023-08-11 NOTE — TELEPHONE ENCOUNTER
Sent estradiol patch back to [facility].  [employer] message sent to patient.  Dari CHRISTIANSON RN  [facility]

## 2023-10-31 ENCOUNTER — MYC REFILL (OUTPATIENT)
Dept: FAMILY MEDICINE | Facility: CLINIC | Age: 48
End: 2023-10-31
Payer: COMMERCIAL

## 2023-10-31 DIAGNOSIS — N95.1 MENOPAUSAL SYNDROME (HOT FLASHES): ICD-10-CM

## 2023-11-01 RX ORDER — ESTRADIOL 0.05 MG/D
1 PATCH, EXTENDED RELEASE TRANSDERMAL
Qty: 24 PATCH | Refills: 0 | Status: SHIPPED | OUTPATIENT
Start: 2023-11-02 | End: 2023-11-01

## 2023-11-01 RX ORDER — ESTRADIOL 0.05 MG/D
1 PATCH, EXTENDED RELEASE TRANSDERMAL
Qty: 24 PATCH | Refills: 3 | Status: SHIPPED | OUTPATIENT
Start: 2023-11-02 | End: 2024-05-27

## 2023-11-03 ENCOUNTER — MYC MEDICAL ADVICE (OUTPATIENT)
Dept: FAMILY MEDICINE | Facility: CLINIC | Age: 48
End: 2023-11-03
Payer: COMMERCIAL

## 2023-11-06 ENCOUNTER — MYC MEDICAL ADVICE (OUTPATIENT)
Dept: FAMILY MEDICINE | Facility: CLINIC | Age: 48
End: 2023-11-06
Payer: COMMERCIAL

## 2023-11-09 ENCOUNTER — VIRTUAL VISIT (OUTPATIENT)
Dept: FAMILY MEDICINE | Facility: CLINIC | Age: 48
End: 2023-11-09
Payer: COMMERCIAL

## 2023-11-09 DIAGNOSIS — N95.1 MENOPAUSAL SYNDROME (HOT FLASHES): ICD-10-CM

## 2023-11-09 DIAGNOSIS — N95.8 GENITOURINARY SYNDROME OF MENOPAUSE: ICD-10-CM

## 2023-11-09 DIAGNOSIS — N95.0 POST-MENOPAUSAL BLEEDING: Primary | ICD-10-CM

## 2023-11-09 PROCEDURE — 99214 OFFICE O/P EST MOD 30 MIN: CPT | Mod: VID | Performed by: FAMILY MEDICINE

## 2023-11-09 NOTE — PATIENT INSTRUCTIONS
Schedule pelvic ultrasound   Schedule a visit with gynecology for after the ultrasound is complete so they can review results with you and decide on further evaluation which may include an endometrial biopsy.

## 2023-11-09 NOTE — PROGRESS NOTES
Trina is a 48 year old who is being evaluated via a billable video visit.             Assessment & Plan     Post-menopausal bleeding  Slight brown vaginal spotting this past week  Discussed that post menopausal bleeding does have a higher incidence with HT and that most is benign, but that further evaluation is recommended. I have ordered a pelvic ultrasound for her and provided a referral to gynecology for further evaluation.     Menopausal syndrome (hot flashes)  Hot flashes and night sweats are now basically gone HT. Continues estradiol 0.05mg twice weekly patch and progesterone 100mg capsule nightly.    Genitourinary syndrome of menopause  Much better with vaginal estrogen cream. Continue current plan.     Did not see significant benefit to vaginal symptoms with systemic HT, so started estrace cream and is currently using 1g twice weekly.             Elena Hartley MD,   [facility]   Trina is a 48 year old, presenting for the following health issues:  No chief complaint on file.       History of Present Illness       Reason for visit:  Post-menopausal vaginal bleeding; it's been more than 17 months since last period  Symptom onset:  1-3 days ago  Symptoms include:  Post-menopausal vaginal bleeding; it's been more than 17 months since last period  Symptom intensity:  Mild  Symptom progression:  Staying the same  Had these symptoms before:  No  What makes it worse:  No pain with symptom  What makes it better:  No pain with symptom    She eats 2-3 servings of fruits and vegetables daily.She consumes 1 sweetened beverage(s) daily.She exercises with enough effort to increase her heart rate 20 to 29 minutes per day.  She exercises with enough effort to increase her heart rate 4 days per week.   She is taking medications regularly.     Sunday when she was applying the vaginal cream the tube came out with some tissue on it. The next morning she noticed some brown spotting on her  "panty liner. For the past week has had some brown spotting. No pain. She used a tampon once because she did not have a fresh liner with her. This morning has some as well. There was a little more red blood.     There has been no change in her HT schedule. Has not missed any progeserone pills or estrogen patch dose or estrogen cream dose.       From 5/23/23 visit:   \"It has been almost a year since her last period. Sex is painful. Even with glide lubricant. Wonders if CBD gummies would help. Does notice vaginal dryness. Less frequent sex now because it is more painful.      Has hot flashes all of the time. Started November 2019, mild. Then jan and feb. Then that summer a little. Then about 2.5 years ago was starting to have significant hot flashes. Couple of times during the night will wake up with them. Also gets thirsty and drinking water helps. Gets covered in sweat. Last summer it came with emotional sense of trauma as well. That has gotten better, but gets tired when they come on and can sense them coming on. Seem to be getting farther apart and not quite as bad as last year. Restless sleep. Wakes up a couple of times every night due to night sweats.      Has had weight gain around the middle. Having hard time losing it despite exercising regularly and eating well.      Notes her lower back has been painful and attributed that to when she visited her sister and brother in law and slept on an inflatable mattress. Was bad for a couple of days. Now intermittrently bad. Mild at this point. Does gardening. Cleaning the deck last night.      She is a nonsmoker. No hx of blood clot. No fhx blood clot. No hx breast cancer. No significant breast cancer risk in the family. Denies any difficulty tolerating hormones in the past when taking SAGAR. \"          Review of Systems        Objective           Vitals:  No vitals were obtained today due to virtual visit.    Physical Exam   GENERAL: Healthy, alert and no distress  EYES: " Eyes grossly normal to inspection.  No discharge or erythema, or obvious scleral/conjunctival abnormalities.  RESP: No audible wheeze, cough, or visible cyanosis.  No visible retractions or increased work of breathing.    SKIN: Visible skin clear. No significant rash, abnormal pigmentation or lesions.  NEURO: Cranial nerves grossly intact.  Mentation and speech appropriate for age.  PSYCH: Mentation appears normal, affect normal/bright, judgement and insight intact, normal speech and appearance well-groomed.    Office Visit on 05/23/2023   Component Date Value Ref Range Status    Interpretation 05/23/2023 Negative for Intraepithelial Lesion or Malignancy (NILM)    Final    Comment 05/23/2023    Final                    Value:This result contains rich text formatting which cannot be displayed here.    Specimen Adequacy 05/23/2023 Satisfactory for evaluation, endocerv/transformation zone component absent, atrophy   Final    Clinical Information 05/23/2023    Final                    Value:This result contains rich text formatting which cannot be displayed here.    Reflex Testing 05/23/2023 Yes regardless of result   Final    Previous Abnormal? 05/23/2023    Final                    Value:This result contains rich text formatting which cannot be displayed here.    Performing Labs 05/23/2023    Final                    Value:This result contains rich text formatting which cannot be displayed here.    Cholesterol 05/23/2023 251 (H)  <200 mg/dL Final    Triglycerides 05/23/2023 58  <150 mg/dL Final    Direct Measure HDL 05/23/2023 73  >=50 mg/dL Final    LDL Cholesterol Calculated 05/23/2023 166 (H)  <=100 mg/dL Final    Non HDL Cholesterol 05/23/2023 178 (H)  <130 mg/dL Final    Glucose 05/23/2023 94  70 - 99 mg/dL Final    Patient Fasting > 8hrs? 05/23/2023 Unknown   Final    Other HR HPV 05/23/2023 Negative  Negative Final    HPV16 DNA 05/23/2023 Negative  Negative Final    HPV18 DNA 05/23/2023 Negative  Negative Final     FINAL DIAGNOSIS 05/23/2023    Final                    Value:This result contains rich text formatting which cannot be displayed here.              Video-Visit Details    Type of service:  Video Visit     Originating Location (pt. Location): Home    Distant Location (provider location):  Off-site  Platform used for Video Visit: Gemma

## 2023-11-10 ENCOUNTER — ANCILLARY PROCEDURE (OUTPATIENT)
Dept: ULTRASOUND IMAGING | Facility: CLINIC | Age: 48
End: 2023-11-10
Attending: FAMILY MEDICINE
Payer: COMMERCIAL

## 2023-11-10 DIAGNOSIS — N95.0 POST-MENOPAUSAL BLEEDING: ICD-10-CM

## 2023-11-10 PROCEDURE — 76830 TRANSVAGINAL US NON-OB: CPT | Performed by: OBSTETRICS & GYNECOLOGY

## 2023-11-10 PROCEDURE — 76856 US EXAM PELVIC COMPLETE: CPT | Performed by: OBSTETRICS & GYNECOLOGY

## 2023-11-14 ENCOUNTER — OFFICE VISIT (OUTPATIENT)
Dept: OBGYN | Facility: CLINIC | Age: 48
End: 2023-11-14
Attending: FAMILY MEDICINE
Payer: COMMERCIAL

## 2023-11-14 VITALS
BODY MASS INDEX: 22.81 KG/M2 | HEIGHT: 69 IN | DIASTOLIC BLOOD PRESSURE: 70 MMHG | SYSTOLIC BLOOD PRESSURE: 112 MMHG | WEIGHT: 154 LBS

## 2023-11-14 DIAGNOSIS — N95.0 POST-MENOPAUSAL BLEEDING: ICD-10-CM

## 2023-11-14 DIAGNOSIS — D25.9 UTERINE LEIOMYOMA, UNSPECIFIED LOCATION: Primary | ICD-10-CM

## 2023-11-14 PROCEDURE — 88305 TISSUE EXAM BY PATHOLOGIST: CPT | Performed by: PATHOLOGY

## 2023-11-14 PROCEDURE — 99203 OFFICE O/P NEW LOW 30 MIN: CPT | Performed by: OBSTETRICS & GYNECOLOGY

## 2023-11-14 PROCEDURE — 58100 BIOPSY OF UTERUS LINING: CPT | Performed by: OBSTETRICS & GYNECOLOGY

## 2023-11-16 LAB
PATH REPORT.COMMENTS IMP SPEC: NORMAL
PATH REPORT.COMMENTS IMP SPEC: NORMAL
PATH REPORT.FINAL DX SPEC: NORMAL
PATH REPORT.GROSS SPEC: NORMAL
PATH REPORT.MICROSCOPIC SPEC OTHER STN: NORMAL
PATH REPORT.RELEVANT HX SPEC: NORMAL
PHOTO IMAGE: NORMAL

## 2023-11-16 NOTE — PROGRESS NOTES
"  SUBJECTIVE:                                                   CC:  Patient presents with:  postmenopausal bleeding: Spotting was brown from Thursday through , pain on left side      HPI:  Naina Merlos is a 48 year old  with post menopausal bleeding and EMS 4.5mm and fibroids who presents for endometrial sampling.     LMP 22 (post menopausal).  No bleeding from then until two weeks ago when she had brown spotting for several days.  She has a history of atrophic vaginitis and dyspareunia, is taking estrace cream vaginally since 2023.    Also is on vivelle dot 0.05 and prometrium 100mg daily.   BMI 23  P0    History of LBP and pelvic pain which felt more like soreness from MSK pain, now that she thinks of it wonders if this is from the fibroids.     Gyn History:  Patient's last menstrual period was 2019 (approximate).       Using menopause for contraception.    Last 3 Pap and HPV Results:       Latest Ref Rng & Units 2023     9:35 AM 3/27/2018     8:18 AM 3/27/2018     7:51 AM   PAP / HPV   PAP  Negative for Intraepithelial Lesion or Malignancy (NILM)      PAP (Historical)    OTHER-NIL, See Result    HPV 16 DNA Negative Negative  Negative     HPV 18 DNA Negative Negative  Negative     Other HR HPV Negative Negative  Negative         PMH, PSH, Soc Hx, Fam Hx, Meds, and allergies reviewed in Epic.    OBJECTIVE:     /70   Ht 1.74 m (5' 8.5\")   Wt 69.9 kg (154 lb)   LMP 2019 (Approximate)   BMI 23.08 kg/m      Gen: Healthy appearing female, no acute distress, comfortable  Psych: mentation appears normal and affect bright  : Normal external female genitalia.  No external lesions.   SSE: Speculum exam reveals vaginal epithelium atrophic c/w menopause status with normal physiologic discharge. Cervix appears smooth, pink, with no visible lesions.     Endometrial biopsy procedure note  2023     INDICATIONS:                                                  "   Is a pregnancy test required: No.  Was a consent obtained?  Yes    Having endometrial biopsy for post-menopausal bleeding    PROCEDURE;                                                      A speculum was placed in the vagina and cervix prepped with betadine. A tenaculum was not attached to the cervix. A small plastic 5 mm Pipelle syringe curette was inserted into the cervical canal. The uterus was sounded to 6 cm's. A vigorous four quadrant biopsy was performed, removing amount scant of tissue. The speculum was removed. This tissue was placed in Formalin and sent to pathology.    The patient tolerated the procedure  well and she reported there was  cramping.      POST PROCEDURE;                                                      There  was no cramping at the time of discharge. She  tolerated the procedure well. There were no complications. Patient was discharged in stable condition.    Patient advised to call the clinic if severe pelvic pain, fever or heavy bleeding.    Elizabeth Jensen MD      [facility]  ULTRASOUND - PELVIC GYN- Transabdominal and Transvaginal     Referring MD: Elena Hartley MD     ===================================     CLINICAL INFORMATION     Indications for ultrasound:   Bleeding/Menses - Post haritha bleeding     LMP: postmenopausal    Hormones: Estrogen and Progesterone     Measurements:  Uterus:  6.86 x 3.70 x 3.84 cm     Position is retroverted.  Contour is irregular w/ myomata: 1 Right Posterior 1.22 x 0.86 x 1.22 cm, 2 Left Anterior  1.20 x 0.86 x 1.35 cm, and 3 Fundal 1.89 x 2.52 x 2.92 cm.     Endo cav: 4.42 mm       Distorted     Right ovary: NV  Left ovary:   2.73 x 2.84 x 2.88 cm Wnl     Cul de sac: no free fluid     Technique: Transvaginal Imaging performed  Transabdominal Imaging performed     ===================================  Impression:     1. Endometrial measuring 4.5mm but slightly distorted by the myoma which are affecting the cavity.  Consider  endometrial sampling.   2. Three measured fibroids with dimensions as above.  3. Normal left ovary, right ovary not seen due to shadowing from bowel.        ASSESSMENT/PLAN:                                                      1. Post-menopausal bleeding  Personally reviewed imaging.  Reviewed how PMB is generally regarded as cancer until proven otherwise.  EMS >4mm recommended for sampling, although her EMS is still very thin, just mildly distorted due to fibroids.  Additionally, she is already on prometrium 100mg daily to protect the endometrium and she has a nl BMI.  Discussed that her overall risk is exceedingly low but sampling could be done to exclude, she was desirous of this today and EMB was done but with scant cellular material.  If it returns completely non diagnostic, can discuss options for hysteroscopy versus watchful waiting and expectant mgmt, either of which would be considered reasonable.   - Ob/Gyn  Referral  - Surgical Pathology Exam  - ENDOMETRIAL BIOPSY W/O CERVICAL DILATION    2. Uterine leiomyoma, unspecified location  Discussed dx and options for mgmt of fibroids.  Fibroids are a benign condition of overgrowth of the muscle cells of the uterus.  They can cause problems when they contribute to heavy bleeding, dysmenorrhea, or bulk symptoms.  There are several treatment strategies beginning with medical mgmt (OCP, ppx NSAIDs, lupron, other newer hormone suppressive medications with add-back therapy, even LNG IUD can be helpful with certain symptoms), non invasive surgical options (UFE, hysteroscopic myomectomy), more advanced surgical options (RFA, abdominal myomectomy) or definitive treatment (hysterectomy).  Fibroids tend to be hormonally responsive, so can grow throughout the reproductive life span, but tend to become quiescent and can even shrink in menopause.   Hers are rather small and not likely to  be causing many symptoms other than perhaps making the endometrial lining more  difficult to evaluate.      Elizabeth Jensen MD, MPH  Obstetrics and Gynecology

## 2023-11-20 RX ORDER — PROGESTERONE 200 MG/1
200 CAPSULE ORAL DAILY
Qty: 90 CAPSULE | Refills: 3 | Status: CANCELLED | OUTPATIENT
Start: 2023-11-20

## 2023-11-20 NOTE — TELEPHONE ENCOUNTER
RN - please call Trina to let her know that I received the ultrasound result and the recommendation from the OB/gyn to have her increase her progesterone dose from 100mg to 200mg daily to help prevent the buildup of the lining of the uterus. If she is okay with that change, please send the new rx I have pended. If she would like to discuss further with me first, please schedule virtual visit. If she would like to visit with gynecology to switch to a progesterone IUD instead of taking progesterone by mouth, please let me know and I will place a referral. Thanks!Elena Hartley M.D.

## 2023-11-20 NOTE — TELEPHONE ENCOUNTER
----- Message from Elizabeth Jensen MD sent at 11/17/2023  2:29 PM CST -----  FYI: pt's EMB came back showing proliferative endometrium.  She probably needs a bit more progesterone to protect her endometrium, you could consider prometrium 200mg (she is currently taking 100mg) or even a LNG-IUD.  I gave the pt the benign result, but let her know I'd keep you in the loop so the two of you can decide what she should do next!    Elizabeth Jensen MD, MPH  [facility]

## 2023-11-20 NOTE — TELEPHONE ENCOUNTER
Writer called patient and reviewed message per Dr. Hartley.  Patient verbalized understanding and requested phone visit with Dr. Hartley to discuss further.    Phone visit scheduled with Dr. Hartley on 11/21/23.  Visit date, time and contact number confirmed with patient.    YULIYA LeeN, RN-BC  [facility]

## 2023-11-21 ENCOUNTER — VIRTUAL VISIT (OUTPATIENT)
Dept: FAMILY MEDICINE | Facility: CLINIC | Age: 48
End: 2023-11-21
Payer: COMMERCIAL

## 2023-11-21 DIAGNOSIS — N95.8 GENITOURINARY SYNDROME OF MENOPAUSE: ICD-10-CM

## 2023-11-21 DIAGNOSIS — N95.1 MENOPAUSAL SYNDROME (HOT FLASHES): Primary | ICD-10-CM

## 2023-11-21 PROCEDURE — 99442 PR PHYSICIAN TELEPHONE EVALUATION 11-20 MIN: CPT | Mod: 93 | Performed by: FAMILY MEDICINE

## 2023-11-21 RX ORDER — MULTIPLE VITAMINS W/ MINERALS TAB 9MG-400MCG
1 TAB ORAL DAILY
COMMUNITY

## 2023-11-21 RX ORDER — PROGESTERONE 200 MG/1
200 CAPSULE ORAL DAILY
Qty: 90 CAPSULE | Refills: 3 | Status: SHIPPED | OUTPATIENT
Start: 2023-11-21 | End: 2023-12-31

## 2023-11-21 NOTE — PROGRESS NOTES
Trina is a 48 year old who is being evaluated via a billable telephone visit.      What phone number would you like to be contacted at? [phone]  How would you like to obtain your AVS? Lulú    Distant Location (provider location):  Off-site    Assessment & Plan     Menopausal syndrome (hot flashes)  Post menopausal bleeding  Episode of postmenopausal bleeding was evaluated by gynecology with ultrasound and endometrial biopsy.  She did have some proliferative endometrium.  Gynecology recommended increasing her progesterone dose to 200 mg nightly for better endometrial suppression.  Discussed further with Trina today and she is interested in making that adjustment.  Discussed an alternative to oral progesterone is the Mirena IUD she will let me know if she is interested in that in the future.  I sent a prescription for progesterone 200 mg daily.  She will continue estradiol 0.05 mg twice weekly patch.  Plan to follow-up at her next preventive visit and earlier if needed.    Hot flashes and night sweats are resolved with HT.      - progesterone (PROMETRIUM) 200 MG capsule; Take 1 capsule (200 mg) by mouth daily    GSM  She has seen improvement in vaginal irritation and wonders if she might have enough benefit from the systemic hormone therapy.  She would like to try off the estrogen cream.  She will stop estrogen cream and let me know if symptoms come back.  In that case, she can start estrogen cream again once nightly for 2 weeks then twice weekly for maintenance.    Prescription drug management         Elena Hartley MD   [facility]   Trina is a 48 year old, presenting for the following health issues:  Follow Up (Ultrasound and progesterone follow up, discuss results)       HPI      Wanted to review plan for management of her HT and increasing the progesterone.     Wondering since she just refilled her progesterone if she can take 2 of the 100mg pills daily.     Wondering  if she can discontinue the vaginal cream.     Tolerating HT well.       Review of Systems        Objective           Vitals:  No vitals were obtained today due to virtual visit.    Physical Exam   healthy, alert, and no distress  PSYCH: Alert and oriented times 3; coherent speech, normal   rate and volume, able to articulate logical thoughts, able   to abstract reason, no tangential thoughts, no hallucinations   or delusions  Her affect is normal  RESP: No cough, no audible wheezing, able to talk in full sentences  Remainder of exam unable to be completed due to telephone visits    Office Visit on 11/14/2023   Component Date Value Ref Range Status    Case Report 11/14/2023    Final                    Value:Surgical Pathology Report                         Case: [id number]                                  Authorizing Provider:  Elizabeth Jensen MD   Collected:           11/14/2023 12:07 PM          Ordering Location:     [facility]  Received:            11/14/2023 12:37 PM                                 [facility]                                                            Pathologist:           Kecia May                                                               Specimen:    Endometrium, EMB                                                                           Final Diagnosis 11/14/2023    Final                    Value:This result contains rich text formatting which cannot be displayed here.    Clinical Information 11/14/2023    Final                    Value:This result contains rich text formatting which cannot be displayed here.    Gross Description 11/14/2023    Final                    Value:This result contains rich text formatting which cannot be displayed here.    Microscopic Description 11/14/2023    Final                    Value:This result contains rich text formatting which cannot be displayed here.    Performing Labs 11/14/2023    Final                     Value:This result contains rich text formatting which cannot be displayed here.     Study Result    Narrative & Impression   [facility]  ULTRASOUND - PELVIC GYN- Transabdominal and Transvaginal     Referring MD: Elena Hartley MD     ===================================     CLINICAL INFORMATION     Indications for ultrasound:   Bleeding/Menses - Post haritha bleeding     LMP: postmenopausal    Hormones: Estrogen and Progesterone     Measurements:  Uterus:  6.86 x 3.70 x 3.84 cm     Position is retroverted.  Contour is irregular w/ myomata: 1 Right Posterior 1.22 x 0.86 x 1.22 cm, 2 Left Anterior  1.20 x 0.86 x 1.35 cm, and 3 Fundal 1.89 x 2.52 x 2.92 cm.     Endo cav: 4.42 mm       Distorted     Right ovary: NV  Left ovary:   2.73 x 2.84 x 2.88 cm Wnl     Cul de sac: no free fluid     Technique: Transvaginal Imaging performed  Transabdominal Imaging performed     ===================================  Impression:     1. Endometrial measuring 4.5mm but slightly distorted by the myoma which are affecting the cavity.  Consider endometrial sampling.   2. Three measured fibroids with dimensions as above.  3. Normal left ovary, right ovary not seen due to shadowing from bowel.      Elizabeth Jensen MD     Note: federal law requires the release of results to patients even prior to the ordering provider viewing the result. Your provider will notify you, generally within 24 hours, of any critical results. If follow up is necessary, you will be notified at that time. Normal results, and abnormal but non-urgent results, will generally be addressed within 48-72 hours.                Phone call duration: 11 minutes

## 2023-12-31 ENCOUNTER — MYC REFILL (OUTPATIENT)
Dept: FAMILY MEDICINE | Facility: CLINIC | Age: 48
End: 2023-12-31
Payer: COMMERCIAL

## 2023-12-31 DIAGNOSIS — N95.1 MENOPAUSAL SYNDROME (HOT FLASHES): ICD-10-CM

## 2024-01-02 RX ORDER — PROGESTERONE 200 MG/1
200 CAPSULE ORAL DAILY
Qty: 90 CAPSULE | Refills: 2 | Status: SHIPPED | OUTPATIENT
Start: 2024-01-02 | End: 2024-05-28 | Stop reason: DRUGHIGH

## 2024-04-14 ENCOUNTER — MYC MEDICAL ADVICE (OUTPATIENT)
Dept: FAMILY MEDICINE | Facility: CLINIC | Age: 49
End: 2024-04-14
Payer: COMMERCIAL

## 2024-04-15 NOTE — TELEPHONE ENCOUNTER
Message sent via [facility].    Roxann Aguilar, RN, BSN, PHN  [facility]  [phone]

## 2024-05-27 SDOH — HEALTH STABILITY: PHYSICAL HEALTH: ON AVERAGE, HOW MANY DAYS PER WEEK DO YOU ENGAGE IN MODERATE TO STRENUOUS EXERCISE (LIKE A BRISK WALK)?: 5 DAYS

## 2024-05-27 SDOH — HEALTH STABILITY: PHYSICAL HEALTH: ON AVERAGE, HOW MANY MINUTES DO YOU ENGAGE IN EXERCISE AT THIS LEVEL?: 40 MIN

## 2024-05-27 ASSESSMENT — SOCIAL DETERMINANTS OF HEALTH (SDOH): HOW OFTEN DO YOU GET TOGETHER WITH FRIENDS OR RELATIVES?: ONCE A WEEK

## 2024-05-27 NOTE — PATIENT INSTRUCTIONS
"Remember to complete your home stool test (Cologuard) for colon cancer screening. This test is recommended every three years. If it is abnormal, a colonoscopy will be recommended. You will receive the Cologuard kit in the mail in the next couple of weeks.  Schedule a mammogram.    Schedule a bone density test (DEXA scan), preferably at [facility] or [facility].    Let me know if you have any vaginal bleeding on the lower dose of progesterone.   Schedule skin cancer screening with [facility] [phone].    Preventive Care Advice   This is general advice we often give to help people stay healthy. Your care team may have specific advice just for you. Please talk to your care team about your own preventive care needs.  Lifestyle  Exercise at least 150 minutes each week (30 minutes a day, 5 days a week).  Do muscle strengthening activities 2 days a week. These help control your weight and prevent disease.  No smoking.  Wear sunscreen to prevent skin cancer.  Have your home tested for radon every 2 to 5 years. Radon is a colorless, odorless gas that can harm your lungs. To learn more, go to www.health.state.[place]. and search for \"Radon in Homes.\"  Keep guns unloaded and locked up in a safe place like a safe or gun vault, or, use a gun lock and hide the keys. Always lock away bullets separately. To learn more, visit [place].gov and search for \"safe gun storage.\"  Nutrition  Eat 5 or more servings of fruits and vegetables each day.  Try wheat bread, brown rice and whole grain pasta (instead of white bread, rice, and pasta).  Get enough calcium and vitamin D. Check the label on foods and aim for 100% of the RDA (recommended daily allowance).  Regular exams  Have a dental exam and cleaning every 6 months.  See your health care team every year to talk about:  Any changes in your health.  Any medicines your care team has prescribed.  Preventive care, family planning, and ways to prevent chronic diseases.  Shots " (vaccines)   HPV shots (up to age 26), if you've never had them before.  Hepatitis B shots (up to age 59), if you've never had them before.  COVID-19 shot: Get this shot when it's due.  Flu shot: Get a flu shot every year.  Tetanus shot: Get a tetanus shot every 10 years.  Pneumococcal, hepatitis A, and RSV shots: Ask your care team if you need these based on your risk.  Shingles shot (for age 50 and up).  General health tests  Diabetes screening:  Starting at age 35, Get screened for diabetes at least every 3 years.  If you are younger than age 35, ask your care team if you should be screened for diabetes.  Cholesterol test: At age 39, start having a cholesterol test every 5 years, or more often if advised.  Bone density scan (DEXA): At age 50, ask your care team if you should have this scan for osteoporosis (brittle bones).  Hepatitis C: Get tested at least once in your life.  Abdominal aortic aneurysm screening: Talk to your doctor about having this screening if you:  Have ever smoked; and  Are biologically male; and  Are between the ages of 65 and 75.  STIs (sexually transmitted infections)  Before age 24: Ask your care team if you should be screened for STIs.  After age 24: Get screened for STIs if you're at risk. You are at risk for STIs (including HIV) if:  You are sexually active with more than one person.  You don't use condoms every time.  You or a partner was diagnosed with a sexually transmitted infection.  If you are at risk for HIV, ask about PrEP medicine to prevent HIV.  Get tested for HIV at least once in your life, whether you are at risk for HIV or not.  Cancer screening tests  Cervical cancer screening: If you have a cervix, begin getting regular cervical cancer screening tests at age 21. Most people who have regular screenings with normal results can stop after age 65. Talk about this with your provider.  Breast cancer scan (mammogram): If you've ever had breasts, begin having regular mammograms  starting at age 40. This is a scan to check for breast cancer.  Colon cancer screening: It is important to start screening for colon cancer at age 45.  Have a colonoscopy test every 10 years (or more often if you're at risk) Or, ask your provider about stool tests like a FIT test every year or Cologuard test every 3 years.  To learn more about your testing options, visit: www.[employer]/631110.pdf.  For help making a decision, visit: omega/ai87553.  Prostate cancer screening test: If you have a prostate and are age 55 to 69, ask your provider if you would benefit from a yearly prostate cancer screening test.  Lung cancer screening: If you are a current or former smoker age 50 to 80, ask your care team if ongoing lung cancer screenings are right for you.  For informational purposes only. Not to replace the advice of your health care provider. Copyright   2023 [facility]. All rights reserved. Clinically reviewed by the [facility] Transitions Program. [employer] 356159 - REV 04/24.    Learning About Stress  What is stress?     Stress is your body's response to a hard situation. Your body can have a physical, emotional, or mental response. Stress is a fact of life for most people, and it affects everyone differently. What causes stress for you may not be stressful for someone else.  A lot of things can cause stress. You may feel stress when you go on a job interview, take a test, or run a race. This kind of short-term stress is normal and even useful. It can help you if you need to work hard or react quickly. For example, stress can help you finish an important job on time.  Long-term stress is caused by ongoing stressful situations or events. Examples of long-term stress include long-term health problems, ongoing problems at work, or conflicts in your family. Long-term stress can harm your health.  How does stress affect your health?  When you are stressed, your body responds as though you are in  danger. It makes hormones that speed up your heart, make you breathe faster, and give you a burst of energy. This is called the fight-or-flight stress response. If the stress is over quickly, your body goes back to normal and no harm is done.  But if stress happens too often or lasts too long, it can have bad effects. Long-term stress can make you more likely to get sick, and it can make symptoms of some diseases worse. If you tense up when you are stressed, you may develop neck, shoulder, or low back pain. Stress is linked to high blood pressure and heart disease.  Stress also harms your emotional health. It can make you nagel, tense, or depressed. Your relationships may suffer, and you may not do well at work or school.  What can you do to manage stress?  You can try these things to help manage stress:   Do something active. Exercise or activity can help reduce stress. Walking is a great way to get started. Even everyday activities such as housecleaning or yard work can help.  Try yoga or enrique chi. These techniques combine exercise and meditation. You may need some training at first to learn them.  Do something you enjoy. For example, listen to music or go to a movie. Practice your hobby or do volunteer work.  Meditate. This can help you relax, because you are not worrying about what happened before or what may happen in the future.  Do guided imagery. Imagine yourself in any setting that helps you feel calm. You can use online videos, books, or a teacher to guide you.  Do breathing exercises. For example:  From a standing position, bend forward from the waist with your knees slightly bent. Let your arms dangle close to the floor.  Breathe in slowly and deeply as you return to a standing position. Roll up slowly and lift your head last.  Hold your breath for just a few seconds in the standing position.  Breathe out slowly and bend forward from the waist.  Let your feelings out. Talk, laugh, cry, and express anger  "when you need to. Talking with supportive friends or family, a counselor, or a becca leader about your feelings is a healthy way to relieve stress. Avoid discussing your feelings with people who make you feel worse.  Write. It may help to write about things that are bothering you. This helps you find out how much stress you feel and what is causing it. When you know this, you can find better ways to cope.  What can you do to prevent stress?  You might try some of these things to help prevent stress:  Manage your time. This helps you find time to do the things you want and need to do.  Get enough sleep. Your body recovers from the stresses of the day while you are sleeping.  Get support. Your family, friends, and community can make a difference in how you experience stress.  Limit your news feed. Avoid or limit time on social media or news that may make you feel stressed.  Do something active. Exercise or activity can help reduce stress. Walking is a great way to get started.  Where can you learn more?  Go to https://www.[employer].net/patiented  Enter N032 in the search box to learn more about \"Learning About Stress.\"  Current as of: October 24, 2023               Content Version: 14.0    4985-2133 [employer].   Care instructions adapted under license by your healthcare professional. If you have questions about a medical condition or this instruction, always ask your healthcare professional. [employer] disclaims any warranty or liability for your use of this information.      "

## 2024-05-28 ENCOUNTER — OFFICE VISIT (OUTPATIENT)
Dept: FAMILY MEDICINE | Facility: CLINIC | Age: 49
End: 2024-05-28
Attending: FAMILY MEDICINE
Payer: COMMERCIAL

## 2024-05-28 ENCOUNTER — ORDERS ONLY (AUTO-RELEASED) (OUTPATIENT)
Dept: FAMILY MEDICINE | Facility: CLINIC | Age: 49
End: 2024-05-28

## 2024-05-28 VITALS
SYSTOLIC BLOOD PRESSURE: 124 MMHG | OXYGEN SATURATION: 99 % | DIASTOLIC BLOOD PRESSURE: 84 MMHG | WEIGHT: 158.1 LBS | TEMPERATURE: 97 F | BODY MASS INDEX: 23.42 KG/M2 | HEART RATE: 68 BPM | HEIGHT: 69 IN | RESPIRATION RATE: 18 BRPM

## 2024-05-28 DIAGNOSIS — Z78.0 POST-MENOPAUSAL: ICD-10-CM

## 2024-05-28 DIAGNOSIS — N89.8 VAGINAL IRRITATION: ICD-10-CM

## 2024-05-28 DIAGNOSIS — N95.1 MENOPAUSAL SYNDROME (HOT FLASHES): ICD-10-CM

## 2024-05-28 DIAGNOSIS — Z12.11 SCREENING FOR COLON CANCER: ICD-10-CM

## 2024-05-28 DIAGNOSIS — Z13.1 SCREENING FOR DIABETES MELLITUS: ICD-10-CM

## 2024-05-28 DIAGNOSIS — Z12.83 SKIN CANCER SCREENING: ICD-10-CM

## 2024-05-28 DIAGNOSIS — Z12.31 VISIT FOR SCREENING MAMMOGRAM: ICD-10-CM

## 2024-05-28 DIAGNOSIS — Z00.00 ROUTINE GENERAL MEDICAL EXAMINATION AT A HEALTH CARE FACILITY: Primary | ICD-10-CM

## 2024-05-28 DIAGNOSIS — Z12.31 SCREENING MAMMOGRAM, ENCOUNTER FOR: ICD-10-CM

## 2024-05-28 DIAGNOSIS — N94.10 DYSPAREUNIA, FEMALE: ICD-10-CM

## 2024-05-28 DIAGNOSIS — Z13.220 LIPID SCREENING: ICD-10-CM

## 2024-05-28 LAB
CHOLEST SERPL-MCNC: 244 MG/DL
FASTING STATUS PATIENT QL REPORTED: YES
FASTING STATUS PATIENT QL REPORTED: YES
GLUCOSE SERPL-MCNC: 94 MG/DL (ref 70–99)
HDLC SERPL-MCNC: 78 MG/DL
LDLC SERPL CALC-MCNC: 154 MG/DL
NONHDLC SERPL-MCNC: 166 MG/DL
TRIGL SERPL-MCNC: 61 MG/DL

## 2024-05-28 PROCEDURE — 82947 ASSAY GLUCOSE BLOOD QUANT: CPT | Performed by: FAMILY MEDICINE

## 2024-05-28 PROCEDURE — 80061 LIPID PANEL: CPT | Performed by: FAMILY MEDICINE

## 2024-05-28 PROCEDURE — 90715 TDAP VACCINE 7 YRS/> IM: CPT | Performed by: FAMILY MEDICINE

## 2024-05-28 PROCEDURE — 99214 OFFICE O/P EST MOD 30 MIN: CPT | Mod: 25 | Performed by: FAMILY MEDICINE

## 2024-05-28 PROCEDURE — 36415 COLL VENOUS BLD VENIPUNCTURE: CPT | Performed by: FAMILY MEDICINE

## 2024-05-28 PROCEDURE — 90471 IMMUNIZATION ADMIN: CPT | Performed by: FAMILY MEDICINE

## 2024-05-28 PROCEDURE — 99396 PREV VISIT EST AGE 40-64: CPT | Mod: 25 | Performed by: FAMILY MEDICINE

## 2024-05-28 RX ORDER — PROGESTERONE 200 MG/1
200 CAPSULE ORAL DAILY
Qty: 90 CAPSULE | Refills: 2 | Status: CANCELLED | OUTPATIENT
Start: 2024-05-28

## 2024-05-28 RX ORDER — ESTRADIOL 0.1 MG/G
2 CREAM VAGINAL
Qty: 42.5 G | Refills: 3 | Status: SHIPPED | OUTPATIENT
Start: 2024-05-30

## 2024-05-28 RX ORDER — ESTRADIOL 0.05 MG/D
1 PATCH, EXTENDED RELEASE TRANSDERMAL
Qty: 24 PATCH | Refills: 3 | Status: SHIPPED | OUTPATIENT
Start: 2024-05-30

## 2024-05-28 RX ORDER — PROGESTERONE 100 MG/1
100 CAPSULE ORAL DAILY
Qty: 90 CAPSULE | Refills: 3 | Status: SHIPPED | OUTPATIENT
Start: 2024-05-28 | End: 2024-08-22

## 2024-05-28 ASSESSMENT — PAIN SCALES - GENERAL: PAINLEVEL: NO PAIN (0)

## 2024-05-28 NOTE — PROGRESS NOTES
Preventive Care Visit  [facility]  Elena Hartley MD Family Medicine  May 28, 2024      Assessment & Plan       ICD-10-CM    1. Routine general medical examination at a health care facility  Z00.00       2. Vaginal irritation  N89.8 estradiol (ESTRACE) 0.1 MG/GM vaginal cream      3. Menopausal syndrome (hot flashes)  N95.1 estradiol (VIVELLE-DOT) 0.05 MG/24HR bi-weekly patch     progesterone (PROMETRIUM) 100 MG capsule      4. Visit for screening mammogram  Z12.31       5. Dyspareunia, female  N94.10 Physical Therapy  Referral      6. Screening mammogram, encounter for  Z12.31 MA Screen Bilateral w/Lexa      7. Screening for colon cancer  Z12.11 COLOGUARD([employer])      8. Lipid screening  Z13.220 Lipid panel reflex to direct LDL Fasting     Lipid panel reflex to direct LDL Fasting      9. Screening for diabetes mellitus  Z13.1 Glucose     Glucose      10. Post-menopausal  Z78.0 DX Bone Density      11. Skin cancer screening  Z12.83 Adult Dermatology  Referral         outine preventive visit  Healthy   Pap with HPV cotesting completed 2023 and normal. Repeat due in 2028  colon cancer screening with cologuard-- repeat due 9/24   Lipid and glucose screening today   Mammogram recommended. Last mammo 5/23.   Tdap recommended  She had both doses of the shingles vaccine as well.     Menopausal syndrome (hot flashes)  Post menopausal bleeding  Improved.   Hot flashes and night sweats are resolved with HT.  Continues progesterone 100mg daily (reduced on her own from 200mg daily due to chest discomfort and vaginal itch - both resolved at lower progesterone dose).  Previous episode of postmenopausal bleeding was evaluated by gynecology with ultrasound and endometrial biopsy.    She did have some proliferative endometrium.    Gynecology had recommended increasing her progesterone dose to 200 mg nightly for better endometrial suppression.    Previously discussed an  alternative to oral progesterone is the Mirena IUD she will let me know if she is interested in that in the future.           GSM    pain at the introitus with intercourse, vaginal dryness. Improved with vaginal estrogen, but recurred somewhat after stopping the vaginal estrogen, so she will restart.                Counseling  Appropriate preventive services were discussed with this patient, including applicable screening as appropriate for fall prevention, nutrition, physical activity, Tobacco-use cessation, weight loss and cognition.  Checklist reviewing preventive services available has been given to the patient.  Reviewed patient's diet, addressing concerns and/or questions.   She is at risk for psychosocial distress and has been provided with information to reduce risk.          Kalyan Mena is a 49 year old, presenting for the following:  Physical        5/28/2024     8:28 AM   Additional Questions   Roomed by Heber MICHELE   Accompanied by self        Health Care Directive  Patient does not have a Health Care Directive or Living Will: Discussed advance care planning with patient; information given to patient to review.    HPI   The patient reports experiencing chest discomfort and vaginal itching, which she associated with a higher dose of progesterone. She has reduced her progesterone dosage to 100 mg and discontinued the use of Estrace Vaginal Cream . Since making these adjustments, she notes that these symptoms have resolved, but vaginal dryness increased again.    She continues her hormone therapy with half of a 100 mg progesterone tablet and estradiol 0.05 mg patches, which she finds effective in managing her menopausal symptoms such as hot flashes and mood swings. She reports no spotting     The patient mentions starting a new morning routine involving yoga stretches and crunches about a month ago, which has helped alleviate her morning stiffness and lower back pain. She exercises four to five times a  week, including walking, biking, and weights.    She discusses her vaginal health, noting improvements in tightness during sex from using Estrace Vaginal Cream before the onset of itching. She considers resuming the cream at a modified dosage to see if it improves her symptoms and would be open to the possibility of consulting a pelvic floor therapist if discomfort persists.       She mentions her last cholesterol panel results and her ongoing use of an estradiol patch, which she believes might positively affect her cholesterol levels. Her family medical history includes various cancers and high cholesterol, prompting a discussion on genetic testing for cancer, which she declines.         5/27/2024   General Health   How would you rate your overall physical health? Excellent   Feel stress (tense, anxious, or unable to sleep) Only a little   (!) STRESS CONCERN      5/27/2024   Nutrition   Three or more servings of calcium each day? Yes   Diet: Regular (no restrictions)   How many servings of fruit and vegetables per day? (!) 2-3   How many sweetened beverages each day? 0-1         5/27/2024   Exercise   Days per week of moderate/strenous exercise 5 days   Average minutes spent exercising at this level 40 min         5/27/2024   Social Factors   Frequency of gathering with friends or relatives Once a week   Worry food won't last until get money to buy more No   Food not last or not have enough money for food? No   Do you have housing?  Yes   Are you worried about losing your housing? No   Lack of transportation? No   Unable to get utilities (heat,electricity)? No         5/27/2024   Fall Risk   Fallen 2 or more times in the past year? No   Trouble with walking or balance? No          5/27/2024   Dental   Dentist two times every year? Yes         5/27/2024   TB Screening   Were you born outside of the [place]? No         Today's PHQ-2 Score:       5/27/2024     7:17 AM   PHQ-2 ( 1999 [employer])   Q1: Little interest or  pleasure in doing things 0   Q2: Feeling down, depressed or hopeless 0   PHQ-2 Score 0   Q1: Little interest or pleasure in doing things Not at all   Q2: Feeling down, depressed or hopeless Not at all   PHQ-2 Score 0           5/27/2024   Substance Use   Alcohol more than 3/day or more than 7/wk No   Do you use any other substances recreationally? No     Social History     Tobacco Use    Smoking status: Never    Smokeless tobacco: Never   Vaping Use    Vaping status: Never Used   Substance Use Topics    Alcohol use: Yes     Comment: 3-5 drinks weekly    Drug use: No           5/24/2023   LAST FHS-7 RESULTS   1st degree relative breast or ovarian cancer No   Any relative bilateral breast cancer No   Any male have breast cancer No   Any ONE woman have BOTH breast AND ovarian cancer No   Any woman with breast cancer before 50yrs No   2 or more relatives with breast AND/OR ovarian cancer Yes   2 or more relatives with breast AND/OR bowel cancer No        Mammogram Screening - Mammogram every 1-2 years updated in Health Maintenance based on mutual decision making        5/27/2024   STI Screening   New sexual partner(s) since last STI/HIV test? No     History of abnormal Pap smear: No - age 30-64 HPV with reflex Pap every 5 years recommended        Latest Ref Rng & Units 5/23/2023     9:35 AM 3/27/2018     8:18 AM 3/27/2018     7:51 AM   PAP / HPV   PAP  Negative for Intraepithelial Lesion or Malignancy (NILM)      PAP (Historical)    OTHER-NIL, See Result    HPV 16 DNA Negative Negative  Negative     HPV 18 DNA Negative Negative  Negative     Other HR HPV Negative Negative  Negative       ASCVD Risk   The 10-year ASCVD risk score (Charlie JULIAN, et al., 2019) is: 1.2%    Values used to calculate the score:      Age: 49 years      Sex: Female      Is Non- : No      Diabetic: No      Tobacco smoker: No      Systolic Blood Pressure: 124 mmHg      Is BP treated: No      HDL Cholesterol: 73  "mg/dL      Total Cholesterol: 251 mg/dL    Fracture Risk Assessment Tool  Link to Frax Calculator  Use the information below to complete the Frax calculator  : 1975  Sex: female  Weight (kg): 71.7 kg (actual weight)  Height (cm): 174 cm  Previous Fragility Fracture:  No  History of parent with fractured hip:  No  Current Smoking:  No  Patient has been on glucocorticoids for more than 3 months (5mg/day or more): No  Rheumatoid Arthritis on Problem List:  No  Secondary Osteoporosis on Problem List:  No  Consumes 3 or more units of alcohol per day: No  Femoral Neck BMD (g/cm2)           Reviewed and updated as needed this visit by Provider                    Past Medical History:   Diagnosis Date    Allergic rhinitis, cause unspecified     CARDIOVASCULAR SCREENING; LDL GOAL LESS THAN 160 2010     Past Surgical History:   Procedure Laterality Date    no surgeries             Objective    Exam  /84 (BP Location: Right arm, Patient Position: Sitting, Cuff Size: Adult Regular)   Pulse 68   Temp 97  F (36.1  C) (Temporal)   Resp 18   Ht 1.74 m (5' 8.5\")   Wt 71.7 kg (158 lb 1.6 oz)   LMP 2019 (Approximate)   SpO2 99%   BMI 23.69 kg/m     Estimated body mass index is 23.69 kg/m  as calculated from the following:    Height as of this encounter: 1.74 m (5' 8.5\").    Weight as of this encounter: 71.7 kg (158 lb 1.6 oz).    Physical Exam  GENERAL: alert and no distress  EYES: Eyes grossly normal to inspection, PERRL and conjunctivae and sclerae normal  HENT: ear canals and TM's normal, nose and mouth without ulcers or lesions  NECK: no adenopathy, no asymmetry, masses, or scars  RESP: lungs clear to auscultation - no rales, rhonchi or wheezes  CV: regular rate and rhythm, normal S1 S2, no S3 or S4, no murmur, click or rub, no peripheral edema  ABDOMEN: soft, nontender, no hepatosplenomegaly, no masses and bowel sounds normal  MS: no gross musculoskeletal defects noted, no edema  SKIN: no " suspicious lesions or rashes  NEURO: Normal strength and tone, mentation intact and speech normal  PSYCH: mentation appears normal, affect normal/bright        Signed Electronically by: Elena Hartley MD

## 2024-05-28 NOTE — NURSING NOTE
Prior to immunization administration, verified patients identity using patient s name and date of birth. Please see Immunization Activity for additional information.     Screening Questionnaire for Adult Immunization    Are you sick today?   No   Do you have allergies to medications, food, a vaccine component or latex?   No   Have you ever had a serious reaction after receiving a vaccination?   No   Do you have a long-term health problem with heart, lung, kidney, or metabolic disease (e.g., diabetes), asthma, a blood disorder, no spleen, complement component deficiency, a cochlear implant, or a spinal fluid leak?  Are you on long-term aspirin therapy?   No   Do you have cancer, leukemia, HIV/AIDS, or any other immune system problem?   No   Do you have a parent, brother, or sister with an immune system problem?   No   In the past 3 months, have you taken medications that affect  your immune system, such as prednisone, other steroids, or anticancer drugs; drugs for the treatment of rheumatoid arthritis, Crohn s disease, or psoriasis; or have you had radiation treatments?   No   Have you had a seizure, or a brain or other nervous system problem?   No   During the past year, have you received a transfusion of blood or blood    products, or been given immune (gamma) globulin or antiviral drug?   No   For women: Are you pregnant or is there a chance you could become       pregnant during the next month?   No   Have you received any vaccinations in the past 4 weeks?   No     Immunization questionnaire answers were all negative.      Patient instructed to remain in clinic for 15 minutes afterwards, and to report any adverse reactions.     Screening performed by Kimberly Coyle MA on 5/28/2024 at 9:21 AM.

## 2024-05-29 NOTE — RESULT ENCOUNTER NOTE
The results of your recent lipid (cholesterol) profile were abnormal.    Here are the results:  Lab Results       Component                Value               Date                       CHOL                     244                 05/28/2024                 CHOL                     201                 04/04/2019            Lab Results       Component                Value               Date                       HDL                      78                  05/28/2024                 HDL                      48                  04/04/2019            Lab Results       Component                Value               Date                       LDL                      154                 05/28/2024                 LDL                      126                 04/04/2019            Lab Results       Component                Value               Date                       TRIG                     61                  05/28/2024                 TRIG                     134                 04/04/2019            Lab Results       Component                Value               Date                       CHOLHDLRATIO             3.2                 03/10/2015              Desired or goal levels are:  CHOLESTEROL: Desirable is less than 200.   HDL (Good Cholesterol): Desirable is greater than 40 (for men) greater than 50 (for women).  LDL (Bad Cholesterol): Desirable is less than 130 (or less than 100 if you have heart disease or diabetes). Borderline 130-160.  TRIGLYCERIDES: Desirable is less than 150.  Borderline is 150-200.    To help lower your LDL (bad cholesterol) you could start adding ground flax seed to your diet. The recommended dose is 2 heaping tablespoonfuls daily, you may want to start with 1 tablespoonful and increase to 2 heaping tablespoonfuls. You can mix or add ground flax seed to hot cereals, yogurt, applesauce, cottage cheese or any sauce mixture that is your portion. Ground flax seed can be found in the baking  aisle near the flour.         Your 10-year heart disease risk score is calculated using the factors shown below.When the risk score is 7.5% or higher, it is recommended that we consider starting a statin (cholesterol-lowering) medication to reduce your risk of heart attack and stroke.      The 10-year ASCVD risk score (Charlie JULIAN, et al., 2019) is: 1.1%    Values used to calculate the score:      Age: 49 years      Sex: Female      Is Non- : No      Diabetic: No      Tobacco smoker: No      Systolic Blood Pressure: 124 mmHg      Is BP treated: No      HDL Cholesterol: 78 mg/dL      Total Cholesterol: 244 mg/dL     As you may know, an elevated cholesterol is one factor that increases your risk for heart disease and stroke. You can improve your cholesterol by controlling the amount and type of fat you eat and by increasing your daily activity level.    Here are some ways to improve your nutrition:  Eat less fat (especially butter, Crisco and other saturated fats)  Buy lean cuts of meat, reduce your portions of red meat or substitute poultry or fish  Use skim milk and low-fat dairy products  Eat no more than 4 egg yolks per week  Avoid fried or fast foods that are high in fat  Eat more fruits and vegetables      Also consider starting or increasing your aerobic activity. Aerobic activity is the best way to improve HDL (good) cholesterol. If this would be new to you, please talk with me first about what activities are safe for you.      Other lab results:    Your glucose (blood sugar) was normal. .    Please feel free to contact us with any questions or if you would like more information.    Elena Hartley M.D.

## 2024-06-07 ENCOUNTER — HOSPITAL ENCOUNTER (OUTPATIENT)
Dept: MAMMOGRAPHY | Facility: CLINIC | Age: 49
Discharge: HOME | End: 2024-06-07
Attending: FAMILY MEDICINE | Admitting: FAMILY MEDICINE
Payer: COMMERCIAL

## 2024-06-07 DIAGNOSIS — Z12.31 SCREENING MAMMOGRAM, ENCOUNTER FOR: ICD-10-CM

## 2024-06-07 PROCEDURE — 77063 BREAST TOMOSYNTHESIS BI: CPT

## 2024-07-12 LAB — NONINV COLON CA DNA+OCC BLD SCRN STL QL: NEGATIVE

## 2024-08-22 ENCOUNTER — MYC MEDICAL ADVICE (OUTPATIENT)
Dept: FAMILY MEDICINE | Facility: CLINIC | Age: 49
End: 2024-08-22
Payer: COMMERCIAL

## 2024-08-22 DIAGNOSIS — N95.1 MENOPAUSAL SYNDROME (HOT FLASHES): ICD-10-CM

## 2024-08-22 RX ORDER — PROGESTERONE 100 MG/1
100 CAPSULE ORAL DAILY
Qty: 90 CAPSULE | Refills: 2 | Status: SHIPPED | OUTPATIENT
Start: 2024-08-22

## 2024-08-22 NOTE — TELEPHONE ENCOUNTER
Writer replied to patient via [employer].    YULIYA MitchellN, RN (she/her)  [facility] RN

## 2025-05-08 ENCOUNTER — PATIENT OUTREACH (OUTPATIENT)
Dept: CARE COORDINATION | Facility: CLINIC | Age: 50
End: 2025-05-08
Payer: COMMERCIAL

## 2025-05-20 DIAGNOSIS — N95.1 MENOPAUSAL SYNDROME (HOT FLASHES): ICD-10-CM

## 2025-05-20 RX ORDER — PROGESTERONE 100 MG/1
100 CAPSULE ORAL DAILY
Qty: 90 CAPSULE | Refills: 2 | Status: SHIPPED | OUTPATIENT
Start: 2025-05-20

## 2025-06-16 DIAGNOSIS — N95.1 MENOPAUSAL SYNDROME (HOT FLASHES): ICD-10-CM

## 2025-06-16 RX ORDER — ESTRADIOL 0.05 MG/D
PATCH, EXTENDED RELEASE TRANSDERMAL
Qty: 16 PATCH | Refills: 0 | Status: SHIPPED | OUTPATIENT
Start: 2025-06-16

## 2025-07-12 ENCOUNTER — HEALTH MAINTENANCE LETTER (OUTPATIENT)
Age: 50
End: 2025-07-12

## 2025-07-21 ENCOUNTER — HOSPITAL ENCOUNTER (OUTPATIENT)
Dept: MAMMOGRAPHY | Facility: CLINIC | Age: 50
Discharge: HOME | End: 2025-07-21
Attending: FAMILY MEDICINE | Admitting: FAMILY MEDICINE
Payer: COMMERCIAL

## 2025-07-21 DIAGNOSIS — Z12.31 VISIT FOR SCREENING MAMMOGRAM: ICD-10-CM

## 2025-07-21 PROCEDURE — 77063 BREAST TOMOSYNTHESIS BI: CPT
